# Patient Record
Sex: FEMALE | Race: WHITE | NOT HISPANIC OR LATINO
[De-identification: names, ages, dates, MRNs, and addresses within clinical notes are randomized per-mention and may not be internally consistent; named-entity substitution may affect disease eponyms.]

---

## 2017-05-31 ENCOUNTER — APPOINTMENT (OUTPATIENT)
Dept: INTERNAL MEDICINE | Facility: CLINIC | Age: 37
End: 2017-05-31

## 2017-05-31 VITALS
WEIGHT: 121 LBS | SYSTOLIC BLOOD PRESSURE: 100 MMHG | RESPIRATION RATE: 13 BRPM | BODY MASS INDEX: 20.16 KG/M2 | OXYGEN SATURATION: 99 % | TEMPERATURE: 97.7 F | DIASTOLIC BLOOD PRESSURE: 60 MMHG | HEIGHT: 65 IN | HEART RATE: 67 BPM

## 2017-05-31 DIAGNOSIS — L70.9 ACNE, UNSPECIFIED: ICD-10-CM

## 2017-05-31 DIAGNOSIS — Z78.9 OTHER SPECIFIED HEALTH STATUS: ICD-10-CM

## 2017-05-31 DIAGNOSIS — K51.20 ULCERATIVE (CHRONIC) PROCTITIS W/OUT COMPLICATIONS: ICD-10-CM

## 2017-05-31 DIAGNOSIS — Z82.49 FAMILY HISTORY OF ISCHEMIC HEART DISEASE AND OTHER DISEASES OF THE CIRCULATORY SYSTEM: ICD-10-CM

## 2017-08-29 ENCOUNTER — APPOINTMENT (OUTPATIENT)
Dept: INTERNAL MEDICINE | Facility: CLINIC | Age: 37
End: 2017-08-29
Payer: COMMERCIAL

## 2017-08-29 VITALS — SYSTOLIC BLOOD PRESSURE: 100 MMHG | DIASTOLIC BLOOD PRESSURE: 64 MMHG

## 2017-08-29 DIAGNOSIS — Z00.00 ENCOUNTER FOR GENERAL ADULT MEDICAL EXAMINATION W/OUT ABNORMAL FINDINGS: ICD-10-CM

## 2017-08-29 PROCEDURE — 99214 OFFICE O/P EST MOD 30 MIN: CPT

## 2017-08-29 RX ORDER — AMOXICILLIN 875 MG/1
875 TABLET, FILM COATED ORAL
Qty: 20 | Refills: 0 | Status: DISCONTINUED | COMMUNITY
Start: 2017-03-29

## 2017-08-29 RX ORDER — MINOCYCLINE HYDROCHLORIDE 100 MG/1
100 TABLET ORAL
Refills: 0 | Status: DISCONTINUED | COMMUNITY
End: 2017-08-29

## 2017-08-29 RX ORDER — MESALAMINE 4 G/60ML
ENEMA RECTAL
Refills: 0 | Status: DISCONTINUED | COMMUNITY
End: 2017-08-29

## 2017-08-29 RX ORDER — MINOCYCLINE HYDROCHLORIDE 50 MG/1
50 CAPSULE ORAL
Qty: 60 | Refills: 0 | Status: DISCONTINUED | COMMUNITY
Start: 2017-05-09

## 2017-08-29 RX ORDER — FLUCONAZOLE 150 MG/1
150 TABLET ORAL
Qty: 2 | Refills: 0 | Status: DISCONTINUED | COMMUNITY
Start: 2017-03-29

## 2017-08-29 RX ORDER — MUPIROCIN 20 MG/G
2 OINTMENT TOPICAL
Qty: 22 | Refills: 0 | Status: DISCONTINUED | COMMUNITY
Start: 2017-04-14

## 2017-08-31 LAB
25(OH)D3 SERPL-MCNC: 40.1 NG/ML
ALBUMIN SERPL ELPH-MCNC: 4.3 G/DL
ALP BLD-CCNC: 39 U/L
ALT SERPL-CCNC: 17 U/L
ANION GAP SERPL CALC-SCNC: 16 MMOL/L
APPEARANCE: CLEAR
AST SERPL-CCNC: 24 U/L
BASOPHILS # BLD AUTO: 0.06 K/UL
BASOPHILS NFR BLD AUTO: 1.1 %
BILIRUB SERPL-MCNC: 0.9 MG/DL
BILIRUBIN URINE: NEGATIVE
BLOOD URINE: NEGATIVE
BUN SERPL-MCNC: 11 MG/DL
CALCIUM SERPL-MCNC: 9.8 MG/DL
CHLORIDE SERPL-SCNC: 104 MMOL/L
CHOLEST SERPL-MCNC: 183 MG/DL
CHOLEST/HDLC SERPL: 2.7 RATIO
CO2 SERPL-SCNC: 24 MMOL/L
COLOR: YELLOW
CREAT SERPL-MCNC: 0.75 MG/DL
CRP SERPL-MCNC: <0.2 MG/DL
EOSINOPHIL # BLD AUTO: 0.3 K/UL
EOSINOPHIL NFR BLD AUTO: 5.3 %
ESTIMATED AVERAGE GLUCOSE: 123 MG/DL
FOLATE SERPL-MCNC: 15.7 NG/ML
GLUCOSE QUALITATIVE U: NORMAL MG/DL
GLUCOSE SERPL-MCNC: 94 MG/DL
HBA1C MFR BLD HPLC: 5.9 %
HCT VFR BLD CALC: 41.1 %
HDLC SERPL-MCNC: 67 MG/DL
HGB BLD-MCNC: 12.5 G/DL
IMM GRANULOCYTES NFR BLD AUTO: 0 %
KETONES URINE: NEGATIVE
LDLC SERPL CALC-MCNC: 106 MG/DL
LEUKOCYTE ESTERASE URINE: NEGATIVE
LYMPHOCYTES # BLD AUTO: 1.85 K/UL
LYMPHOCYTES NFR BLD AUTO: 32.5 %
MAN DIFF?: NORMAL
MCHC RBC-ENTMCNC: 27.6 PG
MCHC RBC-ENTMCNC: 30.4 GM/DL
MCV RBC AUTO: 90.7 FL
MONOCYTES # BLD AUTO: 0.66 K/UL
MONOCYTES NFR BLD AUTO: 11.6 %
NEUTROPHILS # BLD AUTO: 2.82 K/UL
NEUTROPHILS NFR BLD AUTO: 49.5 %
NITRITE URINE: NEGATIVE
PH URINE: 6
PLATELET # BLD AUTO: 334 K/UL
POTASSIUM SERPL-SCNC: 4.7 MMOL/L
PROT SERPL-MCNC: 7.9 G/DL
PROTEIN URINE: NEGATIVE MG/DL
RBC # BLD: 4.53 M/UL
RBC # FLD: 13.5 %
SODIUM SERPL-SCNC: 144 MMOL/L
SPECIFIC GRAVITY URINE: 1.01
TRIGL SERPL-MCNC: 49 MG/DL
TSH SERPL-ACNC: 1.18 UIU/ML
UROBILINOGEN URINE: NORMAL MG/DL
VIT B12 SERPL-MCNC: 504 PG/ML
WBC # FLD AUTO: 5.69 K/UL

## 2017-09-08 ENCOUNTER — APPOINTMENT (OUTPATIENT)
Dept: CT IMAGING | Facility: CLINIC | Age: 37
End: 2017-09-08

## 2017-09-14 ENCOUNTER — RESULT CHARGE (OUTPATIENT)
Age: 37
End: 2017-09-14

## 2017-09-16 ENCOUNTER — APPOINTMENT (OUTPATIENT)
Dept: INTERNAL MEDICINE | Facility: CLINIC | Age: 37
End: 2017-09-16

## 2017-09-18 LAB
INR PPP: 1.5 RATIO
INR PPP: 2.1 RATIO
POCT-PROTHROMBIN TIME: 18.1 SECS
POCT-PROTHROMBIN TIME: 24.6 SECS
QUALITY CONTROL: YES
QUALITY CONTROL: YES

## 2017-09-21 ENCOUNTER — APPOINTMENT (OUTPATIENT)
Dept: INTERNAL MEDICINE | Facility: CLINIC | Age: 37
End: 2017-09-21

## 2017-09-21 ENCOUNTER — MEDICATION RENEWAL (OUTPATIENT)
Age: 37
End: 2017-09-21

## 2017-09-21 LAB
INR PPP: 1.2 RATIO
POCT-PROTHROMBIN TIME: 14.2 SECS
QUALITY CONTROL: YES

## 2019-12-17 ENCOUNTER — TRANSCRIPTION ENCOUNTER (OUTPATIENT)
Age: 39
End: 2019-12-17

## 2019-12-30 ENCOUNTER — OUTPATIENT (OUTPATIENT)
Dept: OUTPATIENT SERVICES | Facility: HOSPITAL | Age: 39
LOS: 1 days | End: 2019-12-30
Payer: COMMERCIAL

## 2019-12-30 ENCOUNTER — APPOINTMENT (OUTPATIENT)
Dept: MRI IMAGING | Facility: CLINIC | Age: 39
End: 2019-12-30
Payer: COMMERCIAL

## 2019-12-30 DIAGNOSIS — Z00.8 ENCOUNTER FOR OTHER GENERAL EXAMINATION: ICD-10-CM

## 2019-12-30 PROCEDURE — A9585: CPT

## 2019-12-30 PROCEDURE — 70546 MR ANGIOGRAPH HEAD W/O&W/DYE: CPT

## 2019-12-30 PROCEDURE — 70549 MR ANGIOGRAPH NECK W/O&W/DYE: CPT | Mod: 26

## 2019-12-30 PROCEDURE — 70549 MR ANGIOGRAPH NECK W/O&W/DYE: CPT

## 2019-12-30 PROCEDURE — 70546 MR ANGIOGRAPH HEAD W/O&W/DYE: CPT | Mod: 26

## 2020-01-07 ENCOUNTER — OUTPATIENT (OUTPATIENT)
Dept: OUTPATIENT SERVICES | Facility: HOSPITAL | Age: 40
LOS: 1 days | End: 2020-01-07
Payer: COMMERCIAL

## 2020-01-07 ENCOUNTER — APPOINTMENT (OUTPATIENT)
Dept: MAMMOGRAPHY | Facility: CLINIC | Age: 40
End: 2020-01-07
Payer: COMMERCIAL

## 2020-01-07 DIAGNOSIS — Z12.31 ENCOUNTER FOR SCREENING MAMMOGRAM FOR MALIGNANT NEOPLASM OF BREAST: ICD-10-CM

## 2020-01-07 PROCEDURE — 77067 SCR MAMMO BI INCL CAD: CPT | Mod: 26

## 2020-01-07 PROCEDURE — 77063 BREAST TOMOSYNTHESIS BI: CPT

## 2020-01-07 PROCEDURE — 77063 BREAST TOMOSYNTHESIS BI: CPT | Mod: 26

## 2020-01-07 PROCEDURE — 77067 SCR MAMMO BI INCL CAD: CPT

## 2020-07-21 ENCOUNTER — APPOINTMENT (OUTPATIENT)
Dept: MRI IMAGING | Facility: CLINIC | Age: 40
End: 2020-07-21
Payer: COMMERCIAL

## 2020-07-21 ENCOUNTER — APPOINTMENT (OUTPATIENT)
Dept: MAMMOGRAPHY | Facility: CLINIC | Age: 40
End: 2020-07-21
Payer: COMMERCIAL

## 2020-07-21 ENCOUNTER — OUTPATIENT (OUTPATIENT)
Dept: OUTPATIENT SERVICES | Facility: HOSPITAL | Age: 40
LOS: 1 days | End: 2020-07-21
Payer: COMMERCIAL

## 2020-07-21 ENCOUNTER — APPOINTMENT (OUTPATIENT)
Dept: ULTRASOUND IMAGING | Facility: CLINIC | Age: 40
End: 2020-07-21
Payer: COMMERCIAL

## 2020-07-21 DIAGNOSIS — Z00.8 ENCOUNTER FOR OTHER GENERAL EXAMINATION: ICD-10-CM

## 2020-07-21 PROCEDURE — 76641 ULTRASOUND BREAST COMPLETE: CPT

## 2020-07-21 PROCEDURE — 76641 ULTRASOUND BREAST COMPLETE: CPT | Mod: 26,50

## 2020-09-10 ENCOUNTER — TRANSCRIPTION ENCOUNTER (OUTPATIENT)
Age: 40
End: 2020-09-10

## 2021-01-21 ENCOUNTER — TRANSCRIPTION ENCOUNTER (OUTPATIENT)
Age: 41
End: 2021-01-21

## 2021-02-03 ENCOUNTER — APPOINTMENT (OUTPATIENT)
Dept: OTOLARYNGOLOGY | Facility: CLINIC | Age: 41
End: 2021-02-03

## 2021-04-04 ENCOUNTER — OUTPATIENT (OUTPATIENT)
Dept: OUTPATIENT SERVICES | Facility: HOSPITAL | Age: 41
LOS: 1 days | End: 2021-04-04
Payer: COMMERCIAL

## 2021-04-04 ENCOUNTER — TRANSCRIPTION ENCOUNTER (OUTPATIENT)
Age: 41
End: 2021-04-04

## 2021-04-04 DIAGNOSIS — Z20.828 CONTACT WITH AND (SUSPECTED) EXPOSURE TO OTHER VIRAL COMMUNICABLE DISEASES: ICD-10-CM

## 2021-04-04 LAB — SARS-COV-2 RNA SPEC QL NAA+PROBE: SIGNIFICANT CHANGE UP

## 2021-04-04 PROCEDURE — U0003: CPT

## 2021-04-04 PROCEDURE — C9803: CPT

## 2021-04-04 PROCEDURE — U0005: CPT

## 2021-04-05 DIAGNOSIS — Z20.828 CONTACT WITH AND (SUSPECTED) EXPOSURE TO OTHER VIRAL COMMUNICABLE DISEASES: ICD-10-CM

## 2021-05-04 ENCOUNTER — APPOINTMENT (OUTPATIENT)
Dept: MAMMOGRAPHY | Facility: CLINIC | Age: 41
End: 2021-05-04
Payer: COMMERCIAL

## 2021-05-04 ENCOUNTER — APPOINTMENT (OUTPATIENT)
Dept: ULTRASOUND IMAGING | Facility: CLINIC | Age: 41
End: 2021-05-04
Payer: COMMERCIAL

## 2021-05-04 ENCOUNTER — OUTPATIENT (OUTPATIENT)
Dept: OUTPATIENT SERVICES | Facility: HOSPITAL | Age: 41
LOS: 1 days | End: 2021-05-04
Payer: COMMERCIAL

## 2021-05-04 DIAGNOSIS — Z00.8 ENCOUNTER FOR OTHER GENERAL EXAMINATION: ICD-10-CM

## 2021-05-04 PROCEDURE — 77067 SCR MAMMO BI INCL CAD: CPT | Mod: 26

## 2021-05-04 PROCEDURE — 76641 ULTRASOUND BREAST COMPLETE: CPT | Mod: 26,50

## 2021-05-04 PROCEDURE — 77063 BREAST TOMOSYNTHESIS BI: CPT | Mod: 26

## 2021-05-04 PROCEDURE — 76641 ULTRASOUND BREAST COMPLETE: CPT

## 2021-05-04 PROCEDURE — 77067 SCR MAMMO BI INCL CAD: CPT

## 2021-05-04 PROCEDURE — 77063 BREAST TOMOSYNTHESIS BI: CPT

## 2021-10-07 NOTE — HISTORY OF PRESENT ILLNESS
[FreeTextEntry1] : Ms. Vickers is a 40 year-old woman with PMH carotid artery dissection, DVT who presents to the office today for initial consultation for fibromuscular dysplasia.

## 2021-10-13 ENCOUNTER — APPOINTMENT (OUTPATIENT)
Dept: NEUROLOGY | Facility: CLINIC | Age: 41
End: 2021-10-13

## 2021-10-13 ENCOUNTER — NON-APPOINTMENT (OUTPATIENT)
Age: 41
End: 2021-10-13

## 2021-10-21 ENCOUNTER — TRANSCRIPTION ENCOUNTER (OUTPATIENT)
Age: 41
End: 2021-10-21

## 2021-10-21 ENCOUNTER — INPATIENT (INPATIENT)
Facility: HOSPITAL | Age: 41
LOS: 0 days | Discharge: HOME | DRG: 300 | End: 2021-10-21
Attending: FAMILY MEDICINE | Admitting: FAMILY MEDICINE
Payer: COMMERCIAL

## 2021-10-21 VITALS
HEART RATE: 92 BPM | OXYGEN SATURATION: 96 % | TEMPERATURE: 98 F | HEIGHT: 65 IN | SYSTOLIC BLOOD PRESSURE: 112 MMHG | WEIGHT: 119.93 LBS | DIASTOLIC BLOOD PRESSURE: 76 MMHG | RESPIRATION RATE: 18 BRPM

## 2021-10-21 VITALS
DIASTOLIC BLOOD PRESSURE: 72 MMHG | HEART RATE: 65 BPM | OXYGEN SATURATION: 97 % | SYSTOLIC BLOOD PRESSURE: 112 MMHG | TEMPERATURE: 99 F | RESPIRATION RATE: 16 BRPM

## 2021-10-21 DIAGNOSIS — Z29.9 ENCOUNTER FOR PROPHYLACTIC MEASURES, UNSPECIFIED: ICD-10-CM

## 2021-10-21 DIAGNOSIS — N28.0 ISCHEMIA AND INFARCTION OF KIDNEY: ICD-10-CM

## 2021-10-21 DIAGNOSIS — Z98.890 OTHER SPECIFIED POSTPROCEDURAL STATES: Chronic | ICD-10-CM

## 2021-10-21 DIAGNOSIS — Z87.39 PERSONAL HISTORY OF OTHER DISEASES OF THE MUSCULOSKELETAL SYSTEM AND CONNECTIVE TISSUE: ICD-10-CM

## 2021-10-21 DIAGNOSIS — K08.409 PARTIAL LOSS OF TEETH, UNSPECIFIED CAUSE, UNSPECIFIED CLASS: Chronic | ICD-10-CM

## 2021-10-21 LAB
ALBUMIN SERPL ELPH-MCNC: 3.9 G/DL — SIGNIFICANT CHANGE UP (ref 3.3–5)
ALP SERPL-CCNC: 44 U/L — SIGNIFICANT CHANGE UP (ref 40–120)
ALT FLD-CCNC: 27 U/L — SIGNIFICANT CHANGE UP (ref 12–78)
ANION GAP SERPL CALC-SCNC: 5 MMOL/L — SIGNIFICANT CHANGE UP (ref 5–17)
APPEARANCE UR: CLEAR — SIGNIFICANT CHANGE UP
APTT BLD: 29.4 SEC — SIGNIFICANT CHANGE UP (ref 27.5–35.5)
AST SERPL-CCNC: 26 U/L — SIGNIFICANT CHANGE UP (ref 15–37)
BACTERIA # UR AUTO: ABNORMAL
BILIRUB SERPL-MCNC: 1 MG/DL — SIGNIFICANT CHANGE UP (ref 0.2–1.2)
BILIRUB UR-MCNC: NEGATIVE — SIGNIFICANT CHANGE UP
BUN SERPL-MCNC: 8 MG/DL — SIGNIFICANT CHANGE UP (ref 7–23)
CALCIUM SERPL-MCNC: 9.7 MG/DL — SIGNIFICANT CHANGE UP (ref 8.5–10.1)
CHLORIDE SERPL-SCNC: 108 MMOL/L — SIGNIFICANT CHANGE UP (ref 96–108)
CO2 SERPL-SCNC: 26 MMOL/L — SIGNIFICANT CHANGE UP (ref 22–31)
COLOR SPEC: YELLOW — SIGNIFICANT CHANGE UP
CREAT SERPL-MCNC: 0.64 MG/DL — SIGNIFICANT CHANGE UP (ref 0.5–1.3)
DIFF PNL FLD: ABNORMAL
EPI CELLS # UR: ABNORMAL
GLUCOSE SERPL-MCNC: 100 MG/DL — HIGH (ref 70–99)
GLUCOSE UR QL: NEGATIVE — SIGNIFICANT CHANGE UP
HCG SERPL-ACNC: <1 MIU/ML — SIGNIFICANT CHANGE UP
HCT VFR BLD CALC: 37.5 % — SIGNIFICANT CHANGE UP (ref 34.5–45)
HGB BLD-MCNC: 12.3 G/DL — SIGNIFICANT CHANGE UP (ref 11.5–15.5)
INR BLD: 1.11 RATIO — SIGNIFICANT CHANGE UP (ref 0.88–1.16)
KETONES UR-MCNC: NEGATIVE — SIGNIFICANT CHANGE UP
LEUKOCYTE ESTERASE UR-ACNC: NEGATIVE — SIGNIFICANT CHANGE UP
MCHC RBC-ENTMCNC: 29.1 PG — SIGNIFICANT CHANGE UP (ref 27–34)
MCHC RBC-ENTMCNC: 32.8 GM/DL — SIGNIFICANT CHANGE UP (ref 32–36)
MCV RBC AUTO: 88.7 FL — SIGNIFICANT CHANGE UP (ref 80–100)
NITRITE UR-MCNC: NEGATIVE — SIGNIFICANT CHANGE UP
NRBC # BLD: 0 /100 WBCS — SIGNIFICANT CHANGE UP (ref 0–0)
PH UR: 6 — SIGNIFICANT CHANGE UP (ref 5–8)
PLATELET # BLD AUTO: 314 K/UL — SIGNIFICANT CHANGE UP (ref 150–400)
POTASSIUM SERPL-MCNC: 4.1 MMOL/L — SIGNIFICANT CHANGE UP (ref 3.5–5.3)
POTASSIUM SERPL-SCNC: 4.1 MMOL/L — SIGNIFICANT CHANGE UP (ref 3.5–5.3)
PROT SERPL-MCNC: 7.9 G/DL — SIGNIFICANT CHANGE UP (ref 6–8.3)
PROT UR-MCNC: NEGATIVE — SIGNIFICANT CHANGE UP
PROTHROM AB SERPL-ACNC: 12.9 SEC — SIGNIFICANT CHANGE UP (ref 10.6–13.6)
RBC # BLD: 4.23 M/UL — SIGNIFICANT CHANGE UP (ref 3.8–5.2)
RBC # FLD: 12.9 % — SIGNIFICANT CHANGE UP (ref 10.3–14.5)
RBC CASTS # UR COMP ASSIST: SIGNIFICANT CHANGE UP /HPF (ref 0–4)
SARS-COV-2 RNA SPEC QL NAA+PROBE: SIGNIFICANT CHANGE UP
SODIUM SERPL-SCNC: 139 MMOL/L — SIGNIFICANT CHANGE UP (ref 135–145)
SP GR SPEC: 1.01 — SIGNIFICANT CHANGE UP (ref 1.01–1.02)
UROBILINOGEN FLD QL: NEGATIVE — SIGNIFICANT CHANGE UP
WBC # BLD: 10.8 K/UL — HIGH (ref 3.8–10.5)
WBC # FLD AUTO: 10.8 K/UL — HIGH (ref 3.8–10.5)
WBC UR QL: SIGNIFICANT CHANGE UP

## 2021-10-21 PROCEDURE — 74177 CT ABD & PELVIS W/CONTRAST: CPT | Mod: 26,MA

## 2021-10-21 PROCEDURE — 99285 EMERGENCY DEPT VISIT HI MDM: CPT

## 2021-10-21 PROCEDURE — 99223 1ST HOSP IP/OBS HIGH 75: CPT

## 2021-10-21 PROCEDURE — 96375 TX/PRO/DX INJ NEW DRUG ADDON: CPT

## 2021-10-21 PROCEDURE — 99222 1ST HOSP IP/OBS MODERATE 55: CPT

## 2021-10-21 PROCEDURE — 93005 ELECTROCARDIOGRAM TRACING: CPT

## 2021-10-21 PROCEDURE — 85730 THROMBOPLASTIN TIME PARTIAL: CPT

## 2021-10-21 PROCEDURE — 81001 URINALYSIS AUTO W/SCOPE: CPT

## 2021-10-21 PROCEDURE — 36415 COLL VENOUS BLD VENIPUNCTURE: CPT

## 2021-10-21 PROCEDURE — 85610 PROTHROMBIN TIME: CPT

## 2021-10-21 PROCEDURE — 85027 COMPLETE CBC AUTOMATED: CPT

## 2021-10-21 PROCEDURE — 93010 ELECTROCARDIOGRAM REPORT: CPT

## 2021-10-21 PROCEDURE — 80053 COMPREHEN METABOLIC PANEL: CPT

## 2021-10-21 PROCEDURE — U0005: CPT

## 2021-10-21 PROCEDURE — 96376 TX/PRO/DX INJ SAME DRUG ADON: CPT

## 2021-10-21 PROCEDURE — 74177 CT ABD & PELVIS W/CONTRAST: CPT | Mod: MA

## 2021-10-21 PROCEDURE — U0003: CPT

## 2021-10-21 PROCEDURE — 96374 THER/PROPH/DIAG INJ IV PUSH: CPT

## 2021-10-21 PROCEDURE — 84702 CHORIONIC GONADOTROPIN TEST: CPT

## 2021-10-21 RX ORDER — MORPHINE SULFATE 50 MG/1
4 CAPSULE, EXTENDED RELEASE ORAL ONCE
Refills: 0 | Status: DISCONTINUED | OUTPATIENT
Start: 2021-10-21 | End: 2021-10-21

## 2021-10-21 RX ORDER — RIVAROXABAN 15 MG-20MG
1 KIT ORAL
Qty: 1 | Refills: 0
Start: 2021-10-21 | End: 2021-12-19

## 2021-10-21 RX ORDER — HEPARIN SODIUM 5000 [USP'U]/ML
INJECTION INTRAVENOUS; SUBCUTANEOUS
Qty: 25000 | Refills: 0 | Status: DISCONTINUED | OUTPATIENT
Start: 2021-10-21 | End: 2021-10-21

## 2021-10-21 RX ORDER — SODIUM CHLORIDE 9 MG/ML
1000 INJECTION INTRAMUSCULAR; INTRAVENOUS; SUBCUTANEOUS ONCE
Refills: 0 | Status: COMPLETED | OUTPATIENT
Start: 2021-10-21 | End: 2021-10-21

## 2021-10-21 RX ORDER — HEPARIN SODIUM 5000 [USP'U]/ML
4000 INJECTION INTRAVENOUS; SUBCUTANEOUS EVERY 6 HOURS
Refills: 0 | Status: DISCONTINUED | OUTPATIENT
Start: 2021-10-21 | End: 2021-10-21

## 2021-10-21 RX ORDER — HYDROMORPHONE HYDROCHLORIDE 2 MG/ML
0.25 INJECTION INTRAMUSCULAR; INTRAVENOUS; SUBCUTANEOUS EVERY 4 HOURS
Refills: 0 | Status: DISCONTINUED | OUTPATIENT
Start: 2021-10-21 | End: 2021-10-21

## 2021-10-21 RX ORDER — FLUTICASONE PROPIONATE 50 MCG
1 SPRAY, SUSPENSION NASAL
Qty: 0 | Refills: 0 | DISCHARGE

## 2021-10-21 RX ORDER — ONDANSETRON 8 MG/1
4 TABLET, FILM COATED ORAL EVERY 8 HOURS
Refills: 0 | Status: DISCONTINUED | OUTPATIENT
Start: 2021-10-21 | End: 2021-10-21

## 2021-10-21 RX ORDER — HYDROMORPHONE HYDROCHLORIDE 2 MG/ML
0.5 INJECTION INTRAMUSCULAR; INTRAVENOUS; SUBCUTANEOUS EVERY 4 HOURS
Refills: 0 | Status: DISCONTINUED | OUTPATIENT
Start: 2021-10-21 | End: 2021-10-21

## 2021-10-21 RX ORDER — OXYCODONE HYDROCHLORIDE 5 MG/1
1 TABLET ORAL
Qty: 12 | Refills: 0
Start: 2021-10-21 | End: 2021-10-23

## 2021-10-21 RX ORDER — ASPIRIN/CALCIUM CARB/MAGNESIUM 324 MG
1 TABLET ORAL
Qty: 0 | Refills: 0 | DISCHARGE

## 2021-10-21 RX ORDER — ONDANSETRON 8 MG/1
4 TABLET, FILM COATED ORAL ONCE
Refills: 0 | Status: COMPLETED | OUTPATIENT
Start: 2021-10-21 | End: 2021-10-21

## 2021-10-21 RX ORDER — IOHEXOL 300 MG/ML
30 INJECTION, SOLUTION INTRAVENOUS ONCE
Refills: 0 | Status: DISCONTINUED | OUTPATIENT
Start: 2021-10-21 | End: 2021-10-21

## 2021-10-21 RX ORDER — HEPARIN SODIUM 5000 [USP'U]/ML
2000 INJECTION INTRAVENOUS; SUBCUTANEOUS EVERY 6 HOURS
Refills: 0 | Status: DISCONTINUED | OUTPATIENT
Start: 2021-10-21 | End: 2021-10-21

## 2021-10-21 RX ORDER — BENZOYL PEROXIDE MICRONIZED 5.8 %
1 TOWELETTE (EA) TOPICAL
Qty: 0 | Refills: 0 | DISCHARGE

## 2021-10-21 RX ORDER — FEXOFENADINE HCL 30 MG
1 TABLET ORAL
Qty: 0 | Refills: 0 | DISCHARGE

## 2021-10-21 RX ORDER — ONDANSETRON 8 MG/1
1 TABLET, FILM COATED ORAL
Qty: 9 | Refills: 0
Start: 2021-10-21 | End: 2021-10-23

## 2021-10-21 RX ADMIN — MORPHINE SULFATE 4 MILLIGRAM(S): 50 CAPSULE, EXTENDED RELEASE ORAL at 03:04

## 2021-10-21 RX ADMIN — MORPHINE SULFATE 4 MILLIGRAM(S): 50 CAPSULE, EXTENDED RELEASE ORAL at 01:11

## 2021-10-21 RX ADMIN — HEPARIN SODIUM UNIT(S)/HR: 5000 INJECTION INTRAVENOUS; SUBCUTANEOUS at 10:02

## 2021-10-21 RX ADMIN — ONDANSETRON 4 MILLIGRAM(S): 8 TABLET, FILM COATED ORAL at 01:11

## 2021-10-21 RX ADMIN — SODIUM CHLORIDE 1000 MILLILITER(S): 9 INJECTION INTRAMUSCULAR; INTRAVENOUS; SUBCUTANEOUS at 05:30

## 2021-10-21 RX ADMIN — SODIUM CHLORIDE 1000 MILLILITER(S): 9 INJECTION INTRAMUSCULAR; INTRAVENOUS; SUBCUTANEOUS at 01:56

## 2021-10-21 RX ADMIN — SODIUM CHLORIDE 1000 MILLILITER(S): 9 INJECTION INTRAMUSCULAR; INTRAVENOUS; SUBCUTANEOUS at 04:35

## 2021-10-21 RX ADMIN — MORPHINE SULFATE 4 MILLIGRAM(S): 50 CAPSULE, EXTENDED RELEASE ORAL at 03:03

## 2021-10-21 RX ADMIN — SODIUM CHLORIDE 1000 MILLILITER(S): 9 INJECTION INTRAMUSCULAR; INTRAVENOUS; SUBCUTANEOUS at 01:11

## 2021-10-21 NOTE — CONSULT NOTE ADULT - SUBJECTIVE AND OBJECTIVE BOX
INCOMPLETE    Vascular Attending: Dr. Minaya    HPI: 41 YO Female w/ PMHx of fibromuscular dysphasia s/p bilateral carotid artery dissections & R vertebral artery dissection (2017) p/w LLQ. Pain started suddenly at about 5:30pm after dinner and is described as sharp, severe and 10/10. Pain has moved more medially since then and briefly decreased in severity, but is now severe again. Patient was diagnosed with fibromuscular dysphasia in 2017 at the same of the arterial dissections and was admitted to [facility]'s ICU and started on a Heparin drip, but no surgical intervention was given. Patient is now followed periodically by vascular surgeon, Dr. Luu at [facility]. Pain is associated with nausea and R sided anterior headache that began prior to the abdominal pain. Admits to getting headaches often, and recently had a CTA about 6 weeks ago that was stable as per the patient. Patient is also followed by neurosurgeon, Dr. Solomon Lacey. Denies fever, chills, chest pain, SOB, dizziness, lightheadedness, weakness, urinary frequency, urgency or pain, changes in bowel movements.     PAST MEDICAL & SURGICAL HISTORY:    Constitutional: Denies fever, fatigue or weight loss.  Skin: Denies rash.  Eyes: Denies recent vision problems or eye pain.  ENT: Denies congestion, ear pain, or sore throat.  Endocrine: Denies thyroid problems.  Cardiovascular: Denies chest pain or palpation.  Respiratory: Denies cough, shortness of breath, congestion, or wheezing.  Gastrointestinal: Denies abdominal pain, nausea, vomiting, or diarrhea.  Genitourinary: Denies dysuria.  Musculoskeletal: Denies joint swelling.  Neurologic: Denies headache.    MEDICATIONS  (STANDING):    MEDICATIONS  (PRN):    Allergies    No Known Allergies    Intolerances    SOCIAL HISTORY:  Admits to smoking minimally in college   Social drinker    Vital Signs Last 24 Hrs  T(C): 36.7 (21 Oct 2021 06:32), Max: 36.9 (21 Oct 2021 04:20)  T(F): 98 (21 Oct 2021 06:32), Max: 98.4 (21 Oct 2021 04:20)  HR: 74 (21 Oct 2021 06:32) (72 - 92)  BP: 125/75 (21 Oct 2021 06:32) (112/76 - 125/75)  BP(mean): --  RR: 20 (21 Oct 2021 06:32) (18 - 20)  SpO2: 99% (21 Oct 2021 06:32) (96% - 99%)    PHYSICAL EXAM:  GENERAL: No acute distress, well-developed  HEAD:  Atraumatic, Normocephalic    MSK:   NEURO:     LABS:                        12.3   10.80 )-----------( 314      ( 21 Oct 2021 01:22 )             37.5     10-    139  |  108  |  8   ----------------------------<  100<H>  4.1   |  26  |  0.64    Ca    9.7      21 Oct 2021 01:22    TPro  7.9  /  Alb  3.9  /  TBili  1.0  /  DBili  x   /  AST  26  /  ALT  27  /  AlkPhos  44  10-21      Urinalysis Basic - ( 21 Oct 2021 01:22 )    Color: Yellow / Appearance: Clear / S.010 / pH: x  Gluc: x / Ketone: Negative  / Bili: Negative / Urobili: Negative   Blood: x / Protein: Negative / Nitrite: Negative   Leuk Esterase: Negative / RBC: 0-2 /HPF / WBC 0-2   Sq Epi: x / Non Sq Epi: Many / Bacteria: Many        RADIOLOGY & ADDITIONAL STUDIES    Impression and Plan: Vascular Attending: Dr. Bueno    HPI: 41 YO Female w/ PMHx of fibromuscular dysplasia s/p bilateral carotid artery dissections & R vertebral artery dissection (2017) p/w LLQ. Pain started suddenly at about 5:30pm after dinner and is described as sharp, severe and 10/10. Pain has moved more medially since then and briefly decreased in severity, but is now severe again. Patient was diagnosed with fibromuscular dysphasia in 2017 at the same of the arterial dissections and was admitted to [facility]'s ICU and started on a Heparin drip, but no surgical intervention was given. Patient was discharged on Xarelto, but discontinued it after 6 months due to heavy menstrual bleeding and now takes ASA81 daily. Patient is now followed periodically by vascular, Dr. Luu at [facility]. Pain is associated with nausea and R sided anterior headache that began prior to the abdominal pain. Admits to getting headaches often, and recently had a CTA about 6 weeks ago that was stable as per the patient. Patient is also followed by neurosurgeon, Dr. Solomon Lacey. Denies fever, chills, chest pain, SOB, dizziness, lightheadedness, weakness, urinary frequency, urgency or pain, changes in bowel movements.     PAST MEDICAL & SURGICAL HISTORY:  Fibromuscular dyplasia    Constitutional: Denies fever, fatigue or weight loss.  Skin: Denies rash.  Eyes: Denies recent vision problems or eye pain.  ENT: Denies congestion, ear pain, or sore throat.  Endocrine: Denies thyroid problems.  Cardiovascular: Denies chest pain or palpation.  Respiratory: Denies cough, shortness of breath, congestion, or wheezing.  Gastrointestinal: Denies abdominal pain, nausea, vomiting, or diarrhea.  Genitourinary: Denies dysuria.  Musculoskeletal: Denies joint swelling.  Neurologic: Denies headache.    MEDICATIONS  (STANDING):    MEDICATIONS  (PRN):    Allergies    No Known Allergies    Intolerances    SOCIAL HISTORY:  Admits to smoking minimally in college   Social drinker    Vital Signs Last 24 Hrs  T(C): 36.7 (21 Oct 2021 06:32), Max: 36.9 (21 Oct 2021 04:20)  T(F): 98 (21 Oct 2021 06:32), Max: 98.4 (21 Oct 2021 04:20)  HR: 74 (21 Oct 2021 06:32) (72 - 92)  BP: 125/75 (21 Oct 2021 06:32) (112/76 - 125/75)  BP(mean): --  RR: 20 (21 Oct 2021 06:32) (18 - 20)  SpO2: 99% (21 Oct 2021 06:32) (96% - 99%)    PHYSICAL EXAM:  GENERAL: No acute distress, well-developed  HEAD:  Atraumatic, Normocephalic  ABDOMEN: Soft, nondistended, nontender. No flank pain bilaterally  VASCULAR: Radial, DP & PT pulses palpable  NEURO: A&O x 3. No weakness or deficits. Sensation and motor intact.    LABS:                        12.3   10.80 )-----------( 314      ( 21 Oct 2021 01:22 )             37.5     10    139  |  108  |  8   ----------------------------<  100<H>  4.1   |  26  |  0.64    Ca    9.7      21 Oct 2021 01:22    TPro  7.9  /  Alb  3.9  /  TBili  1.0  /  DBili  x   /  AST  26  /  ALT  27  /  AlkPhos  44  10-      Urinalysis Basic - ( 21 Oct 2021 01:22 )    Color: Yellow / Appearance: Clear / S.010 / pH: x  Gluc: x / Ketone: Negative  / Bili: Negative / Urobili: Negative   Blood: x / Protein: Negative / Nitrite: Negative   Leuk Esterase: Negative / RBC: 0-2 /HPF / WBC 0-2   Sq Epi: x / Non Sq Epi: Many / Bacteria: Many    RADIOLOGY & ADDITIONAL STUDIES  < from: CT Abdomen and Pelvis w/ IV Cont (10.21.21 @ 03:32) >  FINDINGS:    LOWER CHEST: Within normal limits.    LIVER: Within normal limits.  BILE DUCTS: Normal caliber.  GALLBLADDER: Within normal limits.  SPLEEN: Within normal limits.  PANCREAS: Within normal limits.  ADRENALS: Within normal limits.  KIDNEYS/URETERS: Heterogeneous enhancement of the left posterior kidney. No hydronephrosis.    BLADDER: Within normal limits.  REPRODUCTIVE ORGANS: Uterus and adnexa are grossly unremarkable. Small left adnexal cyst.    BOWEL: No bowel obstruction. Appendix is unremarkable.  PERITONEUM: No ascites.  VESSELS: Normal caliber abdominal aorta.  RETROPERITONEUM/LYMPH NODES: No lymphadenopathy.  ABDOMINAL WALL: Within normal limits.  BONES: Within normal limits.    IMPRESSION:    Heterogeneous enhancement of the left posterior kidney, favored to be due to subsegmental pyelonephritis versus less likely infarct; correlate clinically and consider follow-up imaging.

## 2021-10-21 NOTE — CONSULT NOTE ADULT - SUBJECTIVE AND OBJECTIVE BOX
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 Pt states her pain in her LLQ and can at time be nauseous from it.  Describes the pain as a constant burning 10/10 pain that is non-radiating.Pt was on xarelto but had stopped due to heavy menses as well completing 6 month treatment for prev emboli.  Pt has been surveyed by vascular outpatient and  states she had CTA less than 8 weeks ago which was unremarkable as per her outpt vascular surgery     In the ED pt underwent CT abdomen and pelvis with contrast which was reported as: Heterogeneous enhancement of the left posterior kidney. Vascular surgery was consulted in ED as well and left management recommendations including starting heparin gtt (21 Oct 2021 09:00)    Renal consult called for Left renal infarct. History obtained from chart and patient.       PAST MEDICAL HISTORY:  History of fibromuscular dysplasia  Seasonal allergies  Ulcerative proctitis        PAST SURGICAL HISTORY:  H/O rhinoplasty  S/P LASIK surgery of both eyes  [place] teeth extracted        FAMILY HISTORY:  FH: HTN (hypertension) (Mother)    Family history of cardiac pacemaker (Father)        SOCIAL HISTORY: no smoking, social alcohol use    Allergies    No Known Allergies    Intolerances      Home Medications:  Allegra 24 Hour Allergy oral tablet: 1 tab(s) orally once a day (21 Oct 2021 11:30)  aspirin 81 mg oral tablet: 1 tab(s) orally once a day (21 Oct 2021 11:30)  Multiple Vitamins with Iron oral tablet: 1 tab(s) orally once a day (21 Oct 2021 11:30)    MEDICATIONS  (STANDING):  heparin  Infusion.  Unit(s)/Hr (10 mL/Hr) IV Continuous <Continuous>    MEDICATIONS  (PRN):  heparin   Injectable 4000 Unit(s) IV Push every 6 hours PRN For aPTT less than 40  heparin   Injectable 2000 Unit(s) IV Push every 6 hours PRN For aPTT between 40 - 57  HYDROmorphone  Injectable 0.5 milliGRAM(s) IV Push every 4 hours PRN Severe Pain (7 - 10)  HYDROmorphone  Injectable 0.25 milliGRAM(s) IV Push every 4 hours PRN Moderate Pain (4 - 6)  ondansetron    Tablet 4 milliGRAM(s) Oral every 8 hours PRN Nausea and/or Vomiting      REVIEW OF SYSTEMS:  General: no fever  Respiratory: No cough, SOB  Cardiovascular: No CP or Palpitations	  Gastrointestinal: + abdominal pain   Genitourinary: No urinary complaints	  Musculoskeletal: No leg swelling, No new rash or lesions		  all other systems negative    T(F): 98.8 (10-21-21 @ 08:05), Max: 98.8 (10-21-21 @ 08:05)  HR: 62 (10-21-21 @ 08:05) (62 - 92)  BP: 114/74 (10-21-21 @ 08:05) (112/76 - 125/75)  RR: 17 (10-21-21 @ 08:05) (17 - 20)  SpO2: 100% (10-21-21 @ 08:05) (96% - 100%)  Wt(kg): --    PHYSICAL EXAM:  General: NAD  Respiratory: b/l air entry  Cardiovascular: S1 S2  Gastrointestinal: soft, abdominal tenderness  Extremities: no edema        10-21    139  |  108  |  8   ----------------------------<  100<H>  4.1   |  26  |  0.64    Ca    9.7      21 Oct 2021 01:22    TPro  7.9  /  Alb  3.9  /  TBili  1.0  /  DBili  x   /  AST  26  /  ALT  27  /  AlkPhos  44  10-21                          12.3   10.80 )-----------( 314      ( 21 Oct 2021 01:22 )             37.5       Hemoglobin: 12.3 g/dL (10-21 @ 01:22)  Hematocrit: 37.5 % (10-21 @ 01:22)  Potassium, Serum: 4.1 mmol/L (10-21 @ 01:22)  Blood Urea Nitrogen, Serum: 8 mg/dL (10-21 @ 01:22)      Creatinine, Serum: 0.64 (10-21 @ 01:22)      Urinalysis Basic - ( 21 Oct 2021 01:22 )    Color: Yellow / Appearance: Clear / S.010 / pH: x  Gluc: x / Ketone: Negative  / Bili: Negative / Urobili: Negative   Blood: x / Protein: Negative / Nitrite: Negative   Leuk Esterase: Negative / RBC: 0-2 /HPF / WBC 0-2   Sq Epi: x / Non Sq Epi: Many / Bacteria: Many      LIVER FUNCTIONS - ( 21 Oct 2021 01:22 )  Alb: 3.9 g/dL / Pro: 7.9 g/dL / ALK PHOS: 44 U/L / ALT: 27 U/L / AST: 26 U/L / GGT: x             < from: CT Abdomen and Pelvis w/ IV Cont (10.21.21 @ 03:32) >    EXAM:  CT ABDOMEN AND PELVIS IC                            PROCEDURE DATE:  10/21/2021          INTERPRETATION:  CLINICAL INFORMATION: 40 years old. Female. lower abdominal pain    r/o infection.    COMPARISON: None.    CONTRAST/COMPLICATIONS:  IV Contrast: Omnipaque 350  90 cc administered  10 cc discarded.  Oral Contrast: NONE  Complications: None reported at time of study completion    PROCEDURE:  CT of the Abdomen and Pelvis was performed.  Sagittal and coronal reformats were performed.    FINDINGS:    LOWER CHEST: Within normal limits.    LIVER: Within normal limits.  BILE DUCTS: Normal caliber.  GALLBLADDER: Within normal limits.  SPLEEN: Within normal limits.  PANCREAS: Within normal limits.  ADRENALS: Within normal limits.  KIDNEYS/URETERS: Heterogeneous enhancement of the left posterior kidney. No hydronephrosis.    BLADDER: Within normal limits.  REPRODUCTIVE ORGANS: Uterus and adnexa are grossly unremarkable. Small left adnexal cyst.    BOWEL: No bowel obstruction. Appendix is unremarkable.  PERITONEUM: No ascites.  VESSELS: Normal caliber abdominal aorta.  RETROPERITONEUM/LYMPH NODES: No lymphadenopathy.  ABDOMINAL WALL: Within normal limits.  BONES: Within normal limits.    IMPRESSION:    Heterogeneous enhancement of the left posterior kidney, favored to be due to subsegmental pyelonephritis versus less likely infarct; correlate clinically and consider follow-up imaging.      --- End of Report ---            VIC LOPEZ MD; Attending Radiologist  This document has been electronically signed. Oct 21 2021  3:40AM    < end of copied text >            I&O's Detail

## 2021-10-21 NOTE — DISCHARGE NOTE NURSING/CASE MANAGEMENT/SOCIAL WORK - PATIENT PORTAL LINK FT
You can access the FollowMyHealth Patient Portal offered by [facility] by registering at the following website: [email or web address]. By joining [facility]’s FollowMyHealth portal, you will also be able to view your health information using other applications (apps) compatible with our system.

## 2021-10-21 NOTE — CONSULT NOTE ADULT - ASSESSMENT
Left Renal infarct  h/o Fibromuscular dysplasia    Renal indcies stable. Vascular surgery evaluation in progress. Pt seen by vascular surgery team at [facility]. No hematuria or any other urinary complaints.   Will follow electrolytes and renal function trend. D/w family at bedside. Further recommendations pending clinical course. Thank you for the courtesy of this referral.

## 2021-10-21 NOTE — H&P ADULT - PROBLEM SELECTOR PLAN 1
-acute sec to FMD  -start heparin gtt  -apprec vascular dr alcantara recs and cardio workup-apprec cardio recs Dr Steward  -pt will likely need renal angiogram-will f/u with vascular and radiology on specific type of study to r/u pb in seeds of renal arteries  -apprec renal recs dr parisi  -apprec hemat recs for eventual restarting AC given stoppage in past due to heavy menses for outpt, as well as meds to beds for patient on discharge -acute sec to FMD  -start heparin gtt  -apprec vascular dr alcantara recs and cardio workup-apprec cardio recs Dr Steward  -pt will likely need renal angiogram-will f/u with vascular and radiology on specific type of study to r/u pb in seeds of renal arteries and/or further recommendations on management  -pain control prn  -apprec renal recs dr parisi  -apprec hemat recs for eventual restarting AC given stoppage in past due to heavy menses for outpt, as well as meds to beds for patient on discharge

## 2021-10-21 NOTE — ED PROVIDER NOTE - PROGRESS NOTE DETAILS
Seen and evaluated by Surgical PA who has reviewed case in depth with Vasc Surgeon, Mary. Mateusz Heparinization and admission to medicine.

## 2021-10-21 NOTE — ED PROVIDER NOTE - CLINICAL SUMMARY MEDICAL DECISION MAKING FREE TEXT BOX
h/o fibromuscular dysplasia  c/o acute left abdominal pain ... CT suggesting  possible Renal infarct  will order ct angio abdomen  ICU consulted

## 2021-10-21 NOTE — H&P ADULT - NSICDXFAMILYHX_GEN_ALL_CORE_FT
FAMILY HISTORY:  Father  Still living? Unknown  Family history of cardiac pacemaker, Age at diagnosis: 71-80    Mother  Still living? Unknown  FH: HTN (hypertension), Age at diagnosis: Age Unknown

## 2021-10-21 NOTE — H&P ADULT - NSHPOUTPATIENTPROVIDERS_GEN_ALL_CORE
PMD-Dr Yazan Kunz ([facility])  Vascular-Dr. Luu ([facility])  Gyn- Dr Andrae LUTZ-Dr Damon

## 2021-10-21 NOTE — DISCHARGE NOTE PROVIDER - CARE PROVIDER_API CALL
RULA NELSON  Internal Medicine  [id number]  [place] [address]  Phone: ([phone]  Fax: ()-  Established Patient  Follow Up Time: 1 week    Dr. Andrae (outpatient gynceologist)  Phone: (   )    -  Fax: (   )    -  Established Patient  Follow Up Time: 1 week    Norman Kunz  [address] [id number]  Phone: [phone]  Fax: [phone]  Established Patient  Follow Up Time: 1 week    Mahesh Garcias  HEMATOLOGY  [address], [address]  [place] [address]  Phone: [phone]  Fax: [phone]  Follow Up Time:    RULA NELSON  Internal Medicine  [id number]  [place] [address]  Phone: ()-  Fax: ()-  Established Patient  Follow Up Time: 1 week    Norman Kunz  [address] [id number]  Phone: [phone]  Fax: [phone]  Established Patient  Follow Up Time: 1 week    Mahesh Garcias  HEMATOLOGY  [address], [address]  [place] [address]  Phone: [phone]  Fax: [phone]  Follow Up Time: 2 weeks    Dr. Andrae (outpatient gynceologist)  Phone: (   )    -  Fax: (   )    -  Established Patient  Follow Up Time: 1 week

## 2021-10-21 NOTE — CONSULT NOTE ADULT - ASSESSMENT
History of fibromuscular dysplasia now presenting with lowe abdominal pain with CT scan showing evidence of renal infarct probably related to FMD of left renal artery  has been on xarelto in past and had difficulty with increased menses    Recommendations:  1.  discussed with Dr. Byrne. Patient to be discharged on xarelto.  to have outpatient followup with her vascular surgeon  2.  will evaluate patient as an outpatient for potential menometrorrhagia and need for IV fe.  to also see gyn in evaluation  3.  will evaluate patient as outpatient for additional hematologic issues

## 2021-10-21 NOTE — CONSULT NOTE ADULT - SUBJECTIVE AND OBJECTIVE BOX
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 Pt states her pain in her LLQ and can at time be nauseous from it.  Describes the pain as a constant burning 10/10 pain that is non-radiating.Pt was on xarelto but had stopped due to heavy menses as well completing 6 month treatment for prev emboli.  Pt has been surveyed by vascular outpatient and  states she had CTA less than 8 weeks ago which was unremarkable as per her outpt vascular surgery     In the ED pt underwent CT abdomen and pelvis with contrast which was reported as: Heterogeneous enhancement of the left posterior kidney. Vascular surgery was consulted in ED as well and left management recommendations including starting heparin gtt (21 Oct 2021 09:00)       ROS:  Negative except for:    PAST MEDICAL & SURGICAL HISTORY:  History of fibromuscular dysplasia  09/2017 diagnosed in [place], follows vasc surgery at [facility]    Seasonal allergies    Ulcerative proctitis    H/O rhinoplasty  age 14    S/P LASIK surgery of both eyes  2005    [place] teeth extracted        SOCIAL HISTORY:    FAMILY HISTORY:  FH: HTN (hypertension) (Mother)    Family history of cardiac pacemaker (Father)        MEDICATIONS  (STANDING):  heparin  Infusion.  Unit(s)/Hr (10 mL/Hr) IV Continuous <Continuous>    MEDICATIONS  (PRN):  heparin   Injectable 4000 Unit(s) IV Push every 6 hours PRN For aPTT less than 40  heparin   Injectable 2000 Unit(s) IV Push every 6 hours PRN For aPTT between 40 - 57  HYDROmorphone  Injectable 0.5 milliGRAM(s) IV Push every 4 hours PRN Severe Pain (7 - 10)  HYDROmorphone  Injectable 0.25 milliGRAM(s) IV Push every 4 hours PRN Moderate Pain (4 - 6)  ondansetron    Tablet 4 milliGRAM(s) Oral every 8 hours PRN Nausea and/or Vomiting      Allergies    No Known Allergies    Intolerances        Vital Signs Last 24 Hrs  T(C): 37.1 (21 Oct 2021 08:05), Max: 37.1 (21 Oct 2021 08:05)  T(F): 98.8 (21 Oct 2021 08:05), Max: 98.8 (21 Oct 2021 08:05)  HR: 62 (21 Oct 2021 08:05) (62 - 92)  BP: 114/74 (21 Oct 2021 08:05) (112/76 - 125/75)  BP(mean): --  RR: 17 (21 Oct 2021 08:05) (17 - 20)  SpO2: 100% (21 Oct 2021 08:05) (96% - 100%)    PHYSICAL EXAM  General: adult in NAD  HEENT: clear oropharynx, anicteric sclera, pink conjunctivae  Neck: supple  CV: normal S1S2 with no murmur rubs or gallops  Lungs: clear to auscultation, no wheezes, no rhales  Abdomen: soft non-tender non-distended, no hepato/splenomegaly  Ext: no clubbing cyanosis or edema  Skin: no rashes and no petichiae  Neuro: alert and oriented X3 no focal deficits      LABS:    CBC Full  -  ( 21 Oct 2021 01:22 )  WBC Count : 10.80 K/uL  RBC Count : 4.23 M/uL  Hemoglobin : 12.3 g/dL  Hematocrit : 37.5 %  Platelet Count - Automated : 314 K/uL  Mean Cell Volume : 88.7 fl  Mean Cell Hemoglobin : 29.1 pg  Mean Cell Hemoglobin Concentration : 32.8 gm/dL  Auto Neutrophil # : x  Auto Lymphocyte # : x  Auto Monocyte # : x  Auto Eosinophil # : x  Auto Basophil # : x  Auto Neutrophil % : x  Auto Lymphocyte % : x  Auto Monocyte % : x  Auto Eosinophil % : x  Auto Basophil % : x    10-21    139  |  108  |  8   ----------------------------<  100<H>  4.1   |  26  |  0.64    Ca    9.7      21 Oct 2021 01:22    TPro  7.9  /  Alb  3.9  /  TBili  1.0  /  DBili  x   /  AST  26  /  ALT  27  /  AlkPhos  44  10-21    PT/INR - ( 21 Oct 2021 09:01 )   PT: 12.9 sec;   INR: 1.11 ratio         PTT - ( 21 Oct 2021 09:01 )  PTT:29.4 sec          BLOOD SMEAR INTERPRETATION:    RADIOLOGY & ADDITIONAL STUDIES:    < from: CT Abdomen and Pelvis w/ IV Cont (10.21.21 @ 03:32) >  EXAM:  CT ABDOMEN AND PELVIS IC                            PROCEDURE DATE:  10/21/2021          INTERPRETATION:  CLINICAL INFORMATION: 40 years old. Female. lower abdominal pain    r/o infection.    COMPARISON: None.    CONTRAST/COMPLICATIONS:  IV Contrast: Omnipaque 350  90 cc administered  10 cc discarded.  Oral Contrast: NONE  Complications: None reported at time of study completion    PROCEDURE:  CT of the Abdomen and Pelvis was performed.  Sagittal and coronal reformats were performed.    FINDINGS:    LOWER CHEST: Within normal limits.    LIVER: Within normal limits.  BILE DUCTS: Normal caliber.  GALLBLADDER: Within normal limits.  SPLEEN: Within normal limits.  PANCREAS: Within normal limits.  ADRENALS: Within normal limits.  KIDNEYS/URETERS: Heterogeneous enhancement of the left posterior kidney. No hydronephrosis.    BLADDER: Within normal limits.  REPRODUCTIVE ORGANS: Uterus and adnexa are grossly unremarkable. Small left adnexal cyst.    BOWEL: No bowel obstruction. Appendix is unremarkable.  PERITONEUM: No ascites.  VESSELS: Normal caliber abdominal aorta.  RETROPERITONEUM/LYMPH NODES: No lymphadenopathy.  ABDOMINAL WALL: Within normal limits.  BONES: Within normal limits.    IMPRESSION:    Heterogeneous enhancement of the left posterior kidney, favored to be due to subsegmental pyelonephritis versus less likely infarct; correlate clinically and consider follow-up imaging.      --- End of Report ---            VIC LOPEZ MD; Attending Radiologist  This document has been electronically signed. Oct 21 2021  3:40AM    < end of copied text >

## 2021-10-21 NOTE — H&P ADULT - NSICDXPASTMEDICALHX_GEN_ALL_CORE_FT
PAST MEDICAL HISTORY:  History of fibromuscular dysplasia 09/2017 diagnosed in [place], follows vasc surgery at [facility]    Seasonal allergies     Ulcerative proctitis

## 2021-10-21 NOTE — DISCHARGE NOTE PROVIDER - HOSPITAL COURSE
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 Pt states her pain in her LLQ and can at time be nauseous from it.  Describes the pain as a constant burning 10/10 pain that is non-radiating.Pt was on xarelto but had stopped due to heavy menses as well completing 6 month treatment for prev emboli.  Pt has been surveyed by vascular outpatient and  states she had CTA less than 8 weeks ago which was unremarkable as per her outpt vascular surgery     In the ED pt underwent CT abdomen and pelvis with contrast which was reported as: Heterogeneous enhancement of the left posterior kidney. Vascular surgery was consulted in ED as well and left management recommendations including starting heparin gtt (21 Oct 2021 09:00)      ---  HOSPITAL COURSE/PERTINENT LABS/PROCEDURES PERFORMED/PENDING TESTS: Pt was started on a heparin drip and seen by vascular, hematology, cardiology and renal. 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 Pt agreed on plan and is now stable for discharge.     ---  PATIENT CONDITION:  - stable    ---  PHYSICAL EXAM ON DAY OF DISCHARGE:   Physical Exam:  · Constitutional  detailed exam  · Constitutional Details  well-groomed; no distress  · Eyes  EOMI; PERRL; no drainage or redness  · ENMT  No oral lesions; no gross abnormalities  · Respiratory  Breath Sounds equal & clear to percussion & auscultation, no accessory muscle use  · Cardiovascular  Regular rate & rhythm, normal S1, S2; no murmurs, gallops or rubs; no S3, S4  · Gastrointestinal  detailed exam  · GI Abnormal  tender but improving/controlled  · Tenderness  LLQ  · Gastrointestinal Comments  on mild to moderate palpation  · Extremities  No cyanosis, clubbing or edema  · Neurological  Alert & oriented; no sensory, motor or coordination deficits, normal reflexes  · Skin  No lesions; no rash  · Musculoskeletal  No joint pain, swelling or deformity; no limitation of movement  · Psychiatric  Affect and characteristics of appearance, verbalizations, behaviors are appropriate        ---  CONSULTANTS:   Vascular- Dr Byrne  Cardio- Dr Steward  Hematology-Dr Garcias  Renal-Dr Foss  ---  ADVANCED CARE PLANNING:  - Code status:    FULL  - MOLST completed:      [  x] NO     [  ] YES    ---  TIME SPENT:  I, the attending physician, was physically present for the key portions of the evaluation and management (E/M) service provided. The total amount of time spent reviewing the hospital notes, laboratory values, imaging findings, assessing/counseling the patient, discussing with consultant physicians, social work, nursing staff was 35 minutes

## 2021-10-21 NOTE — H&P ADULT - NSICDXPASTSURGICALHX_GEN_ALL_CORE_FT
PAST SURGICAL HISTORY:  H/O rhinoplasty age 14    S/P LASIK surgery of both eyes 2005    [place] teeth extracted

## 2021-10-21 NOTE — PROVIDER CONTACT NOTE (EICU) - RECOMMENDATIONS
On review of CT, no pulmonary abnormalities seen on lower lung fields, however abnormality described of left kidney concerning for possible infarct.   Pt will require ac. ICU PA will consult Vascular sx for input.   IVF hydration for contrast given to prevent further renal compromise  Case discussed with ICU PA

## 2021-10-21 NOTE — DISCHARGE NOTE PROVIDER - NSDCCPCAREPLAN_GEN_ALL_CORE_FT
PRINCIPAL DISCHARGE DIAGNOSIS  Diagnosis: Renal infarct  Assessment and Plan of Treatment: you were seen by vascular surgeon Dr Byrne and recommended to restart xarelto with close follow up with your primary vascular surgeon from [facility] for further care and management, please also follow up with your primary gynecologist for possible placement of an IUD to prevent heavy menses, also assure to follow up with your primary medical provider within 1 week of discharge for hospital discharge follow up appointment       PRINCIPAL DISCHARGE DIAGNOSIS  Diagnosis: Renal infarct  Assessment and Plan of Treatment: you were seen by vascular surgeon Dr Byrne and recommended to restart xarelto with close follow up with your primary vascular surgeon from [facility] for further care and management, please also follow up with your primary gynecologist for possible placement of an IUD to prevent heavy menses, also assure to follow up with your primary medical provider within 1 week of discharge for hospital discharge follow up appointment, you may try extra strength tylenol 1000mg to help manage your pain and oxycodone if severe to avoid nsaids, please follow up with hematology as well within 1-2 week for further evaluation and managment

## 2021-10-21 NOTE — PROVIDER CONTACT NOTE (EICU) - BACKGROUND
39 y/o female with fibromuscular dysplasia presenting with left flank pain. Found to have enhancement of the left posterior kidney possible pyelonephritis vs infarct on CT A/P.

## 2021-10-21 NOTE — CONSULT NOTE ADULT - ASSESSMENT
39 y/o F w/ PMHx Fibromuscular Dysplasia, b/l carotid artery dissections, Ulcerative proctitis, presenting with left lower quadrant abdominal pain that started yesterday around 5:30PM shortly after eating dinner.     Abdominal pain  - H/o FMD, CT abd/pelvis w/ iv cont concerning for enhancement of L posterior kidney  - concern for ?Renal artery embolism given hx  - Would possibly benefit from more dedicated renal imaging study, possibly ?Renal angiogram  - Agree with heparin gtt for now and ASA 81  - Vascular Dr. Hernandez following, would appreciate input  - Will continue to follow          41 y/o F w/ PMHx Fibromuscular Dysplasia, b/l carotid artery dissections, Ulcerative proctitis, presenting with left lower quadrant abdominal pain that started yesterday around 5:30PM shortly after eating dinner.     Abdominal pain  - H/o FMD, CT abd/pelvis w/ iv cont concerning for enhancement of L posterior kidney  - concern for ?Renal artery embolism given hx  - Would possibly benefit from more dedicated renal imaging study, possibly ?Renal angiogram, but given recent contrast study, would be judicious with additional contrast studies. This AM, Cr 0.6.   - Agree with heparin gtt for now and ASA 81  - Vascular Dr. Hernandez following, would appreciate input  - Will continue to follow          39 y/o F w/ PMHx Fibromuscular Dysplasia, b/l carotid artery dissections, Ulcerative proctitis, presenting with left lower quadrant abdominal pain that started yesterday around 5:30PM shortly after eating dinner.     Abdominal pain  - H/o FMD, CT abd/pelvis w/ iv cont concerning for enhancement of L posterior kidney  - concern for ?Renal artery embolism given hx  - Would possibly benefit from more dedicated renal imaging study, possibly ?Renal angiogram vs renal duplex study, but given recent contrast study, would be judicious with additional contrast studies. This AM, Cr 0.6.   - Agree with heparin gtt for now   - Vascular Dr. Hernandez following, would appreciate input  - Will continue to follow          41 y/o F w/ PMHx Fibromuscular Dysplasia, b/l carotid artery dissections, Ulcerative proctitis, presenting with left lower quadrant abdominal pain that started yesterday around 5:30PM shortly after eating dinner.     Abdominal pain  - H/o FMD, CT abd/pelvis w/ iv cont concerning for enhancement of L posterior kidney  - concern for ?Renal artery embolism given hx  - Would possibly benefit from more dedicated renal imaging study, possibly ?Renal angiogram vs renal duplex study, but given recent contrast study, would be judicious with additional contrast studies. This AM, Cr 0.6.   - Agree with heparin gtt for now   - would recommned holding home asa at this time  - Vascular Dr. Hernandez following, would appreciate input  - Will continue to follow          41 y/o F w/ PMHx Fibromuscular Dysplasia, b/l carotid artery dissections, Ulcerative proctitis, presenting with left lower quadrant abdominal pain that started yesterday around 5:30PM shortly after eating dinner.     Abdominal pain  - H/o FMD, CT abd/pelvis w/ iv cont concerning for enhancement of L posterior kidney  - concern for ?Renal artery embolism given hx  - Would possibly benefit from more dedicated renal imaging study, possibly ?Renal angiogram vs renal duplex study, but given recent contrast study, would be judicious with additional contrast studies. This AM, Cr 0.6.   - Agree with heparin gtt for now   - would recommend holding home asa at this time  - EKG ordered, f/u results  - Vascular Dr. Hernandez following, would appreciate input  - Will continue to follow          41 y/o F w/ PMHx Fibromuscular Dysplasia, b/l carotid artery dissections, Ulcerative proctitis, presenting with left lower quadrant abdominal pain that started yesterday around 5:30PM shortly after eating dinner.     Abdominal pain  - H/o FMD, CT abd/pelvis w/ iv cont concerning for enhancement of L posterior kidney  - concern for ?Renal artery embolism given hx  - Would possibly benefit from more dedicated renal imaging study, possibly ?Renal angiogram vs renal duplex study, but given recent contrast study, would be judicious with additional contrast studies. This AM, Cr 0.6.   - Agree with heparin gtt for now   - Pt formerly on xarelto, but stopped 2/2 heavy menses/low H/H back in 2018, would recommend GYN consult for recommendations on alternative forms of contraception to minimize blood loss going forward if pt were to need DOAC on d/c  - would recommend holding home asa at this time  - EKG sinus rhythm, short [place] interval  - Vascular Dr. Hernandez following, would appreciate input  - Will continue to follow          41 y/o F w/ PMHx Fibromuscular Dysplasia, b/l carotid artery dissections, Ulcerative proctitis, presenting with left lower quadrant abdominal pain that started yesterday around 5:30PM shortly after eating dinner.     - H/o FMD, CT abd/pelvis w/ iv cont concerning for enhancement of L posterior kidney  - concern for ?Renal artery embolism given hx  - Would possibly benefit from more dedicated renal imaging study, possibly ?Renal angiogram vs renal duplex study, but given recent contrast study, would be judicious with additional contrast studies. This AM, Cr 0.6.   - Agree with heparin gtt for now   - Pt formerly on xarelto, but stopped 2/2 heavy menses/low H/H back in 2018, would recommend GYN consult for recommendations on alternative forms of contraception to minimize blood loss going forward if pt were to need DOAC on d/c  - would recommend holding home asa at this time  - EKG sinus rhythm, short [place] interval  - Vascular Dr. Hernandez following, would appreciate input  - Will continue to follow

## 2021-10-21 NOTE — CONSULT NOTE ADULT - SUBJECTIVE AND OBJECTIVE BOX
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    In [place] until last night when she developed LLQ abd pain.  She tried simethicone and yoga poses without relief.     CT with a renal infarct.    Moderate relief of pain with opiates.     She has no ICU criteria     D/W Dr Tinajero and Renata and Dr Gibbons of the E-ICU    Needs a vascular consult ASAP to determine the need for AC and or if any additional contrast scans are needed to better look at the L renal artery and its branches.     She has received almost 3 liters of IVF so far with minimal UOP.  Though creat is only 0.6  GFR is decreased, would let vascular sx decide if another contrast study is needed.

## 2021-10-21 NOTE — H&P ADULT - ASSESSMENT
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

## 2021-10-21 NOTE — CONSULT NOTE ADULT - SUBJECTIVE AND OBJECTIVE BOX
CHARTING IN PROGRESS    HPI: 39 y/o F w/ PMHx Fibromuscular Dysplasia, b/l carotid artery dissections, Ulcerative proctitis, presenting with left lower quadrant abdominal pain that started yesterday around 5:30PM shortly after eating dinner. Describes the pain as a constant burning 10/10 pain that is non-radiating. Pain is controlled with IV morphine given here in ER. States she was on Xarelto for risk of clots, but was dc'd in 2018 after being told that she was "stable". Admits to some chills, and nausea earlier, but no dizziness, vomiting, chest pain, or shortness of breath.       Constitutional: admits to chills and nausea  HEENT: denies blurry vision, difficulty hearing  Respiratory: denies SOB, GONSALEZ, cough  Cardiovascular: denies CP, palpitations, orthopnea, PND, LE edema  Gastrointestinal: admits to left lower quadrant abd pain; denies vomiting  Genitourinary: denies urinary changes  Neurologic: denies headache, weakness, dizziness      PAST MEDICAL & SURGICAL HISTORY:  Fibromuscular Dysplasia, b/l carotid artery dissections    Rhinoplasty      Meds(Home): ASA 81mg qd, allegra    MEDICATIONS  (STANDING):  heparin  Infusion.  Unit(s)/Hr (10 mL/Hr) IV Continuous <Continuous>    MEDICATIONS  (PRN):  heparin   Injectable 4000 Unit(s) IV Push every 6 hours PRN For aPTT less than 40  heparin   Injectable 2000 Unit(s) IV Push every 6 hours PRN For aPTT between 40 - 57      SOCIAL HISTORY: No tobacco, Alcohol or Ddrug use    FAMILY HISTORY:  Mother with hx of HTN    Allergies: nkda    ECG: pending read    Vital Signs Last 24 Hrs  T(C): 37.1 (21 Oct 2021 08:05), Max: 37.1 (21 Oct 2021 08:05)  T(F): 98.8 (21 Oct 2021 08:05), Max: 98.8 (21 Oct 2021 08:05)  HR: 62 (21 Oct 2021 08:05) (62 - 92)  BP: 114/74 (21 Oct 2021 08:05) (112/76 - 125/75)  BP(mean): --  RR: 17 (21 Oct 2021 08:05) (17 - 20)  SpO2: 100% (21 Oct 2021 08:05) (96% - 100%)    Physical Exam:  General: Well developed, well nourished, NAD  HEENT: No carotid bruis noted; NCAT, PERRL, EOMI bl, moist mucous membranes   Neurology: A&Ox3, appropriately interactive  Respiratory: CTA B/L, No W/R/R  CV: RRR, +S1/S2, no murmurs, rubs or gallops  Abdominal: +mild tenderness to palpation of left lower quadrant; Soft, non-distended, +BSx4  Extremities: No C/C/E  MSK: Normal ROM, no joint erythema or warmth, no joint swelling       I&O's Detail      LABS:                        12.3   10.80 )-----------( 314      ( 21 Oct 2021 01:22 )             37.5     10    139  |  108  |  8   ----------------------------<  100<H>  4.1   |  26  |  0.64    Ca    9.7      21 Oct 2021 01:22    TPro  7.9  /  Alb  3.9  /  TBili  1.0  /  DBili  x   /  AST  26  /  ALT  27  /  AlkPhos  44  10        PT/INR - ( 21 Oct 2021 09:01 )   PT: 12.9 sec;   INR: 1.11 ratio         PTT - ( 21 Oct 2021 09:01 )  PTT:29.4 sec  Urinalysis Basic - ( 21 Oct 2021 01:22 )    Color: Yellow / Appearance: Clear / S.010 / pH: x  Gluc: x / Ketone: Negative  / Bili: Negative / Urobili: Negative   Blood: x / Protein: Negative / Nitrite: Negative   Leuk Esterase: Negative / RBC: 0-2 /HPF / WBC 0-2   Sq Epi: x / Non Sq Epi: Many / Bacteria: Many      I&O's Summary    BNP  RADIOLOGY & ADDITIONAL STUDIES:    < from: CT Abdomen and Pelvis w/ IV Cont (10.21.21 @ 03:32) >  EXAM:  CT ABDOMEN AND PELVIS IC                            PROCEDURE DATE:  10/21/2021          INTERPRETATION:  CLINICAL INFORMATION: 40 years old. Female. lower abdominal pain    r/o infection.    COMPARISON: None.    CONTRAST/COMPLICATIONS:  IV Contrast: Omnipaque 350  90 cc administered  10 cc discarded.  Oral Contrast: NONE  Complications: None reported at time of study completion    PROCEDURE:  CT of the Abdomen and Pelvis was performed.  Sagittal and coronal reformats were performed.    FINDINGS:    LOWER CHEST: Within normal limits.    LIVER: Within normal limits.  BILE DUCTS: Normal caliber.  GALLBLADDER: Within normal limits.  SPLEEN: Within normal limits.  PANCREAS: Within normal limits.  ADRENALS: Within normal limits.  KIDNEYS/URETERS: Heterogeneous enhancement of the left posterior kidney. No hydronephrosis.    BLADDER: Within normal limits.  REPRODUCTIVE ORGANS: Uterus and adnexa are grossly unremarkable. Small left adnexal cyst.    BOWEL: No bowel obstruction. Appendix is unremarkable.  PERITONEUM: No ascites.  VESSELS: Normal caliber abdominal aorta.  RETROPERITONEUM/LYMPH NODES: No lymphadenopathy.  ABDOMINAL WALL: Within normal limits.  BONES: Within normal limits.    IMPRESSION:    Heterogeneous enhancement of the left posterior kidney, favored to be due to subsegmental pyelonephritis versus less likely infarct; correlate clinically and consider follow-up imaging.    < end of copied text >   HPI: 39 y/o F w/ PMHx Fibromuscular Dysplasia, b/l carotid artery dissections, Ulcerative proctitis, presenting with left lower quadrant abdominal pain that started yesterday around 5:30PM shortly after eating dinner. Describes the pain as a constant burning 10/10 pain that is non-radiating. Pain is controlled with IV morphine given here in ER. States she was on Xarelto for risk of clots, but was dc'd in 2018 after being told that she was "stable". Admits to some chills, and nausea earlier, but no dizziness, vomiting, chest pain, or shortness of breath.       Constitutional: admits to chills and nausea  HEENT: denies blurry vision, difficulty hearing  Respiratory: denies SOB, GONSALEZ, cough  Cardiovascular: denies CP, palpitations, orthopnea, PND, LE edema  Gastrointestinal: admits to left lower quadrant abd pain; denies vomiting  Genitourinary: denies urinary changes  Neurologic: denies headache, weakness, dizziness      PAST MEDICAL & SURGICAL HISTORY:  Fibromuscular Dysplasia, b/l carotid artery dissections    Rhinoplasty      Meds(Home): ASA 81mg qd, allegra    MEDICATIONS  (STANDING):  heparin  Infusion.  Unit(s)/Hr (10 mL/Hr) IV Continuous <Continuous>    MEDICATIONS  (PRN):  heparin   Injectable 4000 Unit(s) IV Push every 6 hours PRN For aPTT less than 40  heparin   Injectable 2000 Unit(s) IV Push every 6 hours PRN For aPTT between 40 - 57      SOCIAL HISTORY: No tobacco, Alcohol or Ddrug use    FAMILY HISTORY:  Mother with hx of HTN    Allergies: nkda    ECG: pending read    Vital Signs Last 24 Hrs  T(C): 37.1 (21 Oct 2021 08:05), Max: 37.1 (21 Oct 2021 08:05)  T(F): 98.8 (21 Oct 2021 08:05), Max: 98.8 (21 Oct 2021 08:05)  HR: 62 (21 Oct 2021 08:05) (62 - 92)  BP: 114/74 (21 Oct 2021 08:05) (112/76 - 125/75)  BP(mean): --  RR: 17 (21 Oct 2021 08:05) (17 - 20)  SpO2: 100% (21 Oct 2021 08:05) (96% - 100%)    Physical Exam:  General: Well developed, well nourished, NAD  HEENT: No carotid bruis noted; NCAT, PERRL, EOMI bl, moist mucous membranes   Neurology: A&Ox3, appropriately interactive  Respiratory: CTA B/L, No W/R/R  CV: RRR, +S1/S2, no murmurs, rubs or gallops  Abdominal: +mild tenderness to palpation of left lower quadrant; Soft, non-distended, +BSx4  Extremities: No C/C/E  MSK: Normal ROM, no joint erythema or warmth, no joint swelling       I&O's Detail      LABS:                        12.3   10.80 )-----------( 314      ( 21 Oct 2021 01:22 )             37.5     10    139  |  108  |  8   ----------------------------<  100<H>  4.1   |  26  |  0.64    Ca    9.7      21 Oct 2021 01:22    TPro  7.9  /  Alb  3.9  /  TBili  1.0  /  DBili  x   /  AST  26  /  ALT  27  /  AlkPhos  44  10-        PT/INR - ( 21 Oct 2021 09:01 )   PT: 12.9 sec;   INR: 1.11 ratio         PTT - ( 21 Oct 2021 09:01 )  PTT:29.4 sec  Urinalysis Basic - ( 21 Oct 2021 01:22 )    Color: Yellow / Appearance: Clear / S.010 / pH: x  Gluc: x / Ketone: Negative  / Bili: Negative / Urobili: Negative   Blood: x / Protein: Negative / Nitrite: Negative   Leuk Esterase: Negative / RBC: 0-2 /HPF / WBC 0-2   Sq Epi: x / Non Sq Epi: Many / Bacteria: Many      I&O's Summary    BNP  RADIOLOGY & ADDITIONAL STUDIES:    < from: CT Abdomen and Pelvis w/ IV Cont (10.21.21 @ 03:32) >  EXAM:  CT ABDOMEN AND PELVIS IC                            PROCEDURE DATE:  10/21/2021          INTERPRETATION:  CLINICAL INFORMATION: 40 years old. Female. lower abdominal pain    r/o infection.    COMPARISON: None.    CONTRAST/COMPLICATIONS:  IV Contrast: Omnipaque 350  90 cc administered  10 cc discarded.  Oral Contrast: NONE  Complications: None reported at time of study completion    PROCEDURE:  CT of the Abdomen and Pelvis was performed.  Sagittal and coronal reformats were performed.    FINDINGS:    LOWER CHEST: Within normal limits.    LIVER: Within normal limits.  BILE DUCTS: Normal caliber.  GALLBLADDER: Within normal limits.  SPLEEN: Within normal limits.  PANCREAS: Within normal limits.  ADRENALS: Within normal limits.  KIDNEYS/URETERS: Heterogeneous enhancement of the left posterior kidney. No hydronephrosis.    BLADDER: Within normal limits.  REPRODUCTIVE ORGANS: Uterus and adnexa are grossly unremarkable. Small left adnexal cyst.    BOWEL: No bowel obstruction. Appendix is unremarkable.  PERITONEUM: No ascites.  VESSELS: Normal caliber abdominal aorta.  RETROPERITONEUM/LYMPH NODES: No lymphadenopathy.  ABDOMINAL WALL: Within normal limits.  BONES: Within normal limits.    IMPRESSION:    Heterogeneous enhancement of the left posterior kidney, favored to be due to subsegmental pyelonephritis versus less likely infarct; correlate clinically and consider follow-up imaging.    < end of copied text >   HPI: 39 y/o F w/ PMHx Fibromuscular Dysplasia, b/l carotid artery dissections, Ulcerative proctitis, presenting with left lower quadrant abdominal pain that started yesterday around 5:30PM shortly after eating dinner. Describes the pain as a constant burning 10/10 pain that is non-radiating. Pain is controlled with IV morphine given here in ER. States she was on Xarelto for risk of clots, but was dc'd in 2018 after having heavy menses/low H/H, and being told that she was "stable" enough to not be on it anymore. Admits to some chills, and nausea earlier, but no dizziness, vomiting, chest pain, or shortness of breath.       Constitutional: admits to chills and nausea  HEENT: denies blurry vision, difficulty hearing  Respiratory: denies SOB, GONSALEZ, cough  Cardiovascular: denies CP, palpitations, orthopnea, PND, LE edema  Gastrointestinal: admits to left lower quadrant abd pain; denies vomiting  Genitourinary: denies urinary changes  Neurologic: denies headache, weakness, dizziness      PAST MEDICAL & SURGICAL HISTORY:  Fibromuscular Dysplasia, b/l carotid artery dissections    Rhinoplasty      Meds(Home): ASA 81mg qd, allegra    MEDICATIONS  (STANDING):  heparin  Infusion.  Unit(s)/Hr (10 mL/Hr) IV Continuous <Continuous>    MEDICATIONS  (PRN):  heparin   Injectable 4000 Unit(s) IV Push every 6 hours PRN For aPTT less than 40  heparin   Injectable 2000 Unit(s) IV Push every 6 hours PRN For aPTT between 40 - 57      SOCIAL HISTORY: No tobacco, Alcohol or Ddrug use    FAMILY HISTORY:  Mother with hx of HTN    Allergies: nkda    ECG: Sinus rhythm, short VA interval    Vital Signs Last 24 Hrs  T(C): 37.1 (21 Oct 2021 08:05), Max: 37.1 (21 Oct 2021 08:05)  T(F): 98.8 (21 Oct 2021 08:05), Max: 98.8 (21 Oct 2021 08:05)  HR: 62 (21 Oct 2021 08:05) (62 - 92)  BP: 114/74 (21 Oct 2021 08:05) (112/76 - 125/75)  BP(mean): --  RR: 17 (21 Oct 2021 08:05) (17 - 20)  SpO2: 100% (21 Oct 2021 08:05) (96% - 100%)    Physical Exam:  General: Well developed, well nourished, NAD  HEENT: No carotid bruis noted; NCAT, PERRL, EOMI bl, moist mucous membranes   Neurology: A&Ox3, appropriately interactive  Respiratory: CTA B/L, No W/R/R  CV: RRR, +S1/S2, no murmurs, rubs or gallops  Abdominal: +mild tenderness to palpation of left lower quadrant; Soft, non-distended, +BSx4  Extremities: No C/C/E  MSK: Normal ROM, no joint erythema or warmth, no joint swelling       I&O's Detail      LABS:                        12.3   10.80 )-----------( 314      ( 21 Oct 2021 01:22 )             37.5     10    139  |  108  |  8   ----------------------------<  100<H>  4.1   |  26  |  0.64    Ca    9.7      21 Oct 2021 01:22    TPro  7.9  /  Alb  3.9  /  TBili  1.0  /  DBili  x   /  AST  26  /  ALT  27  /  AlkPhos  44  10        PT/INR - ( 21 Oct 2021 09:01 )   PT: 12.9 sec;   INR: 1.11 ratio         PTT - ( 21 Oct 2021 09:01 )  PTT:29.4 sec  Urinalysis Basic - ( 21 Oct 2021 01:22 )    Color: Yellow / Appearance: Clear / S.010 / pH: x  Gluc: x / Ketone: Negative  / Bili: Negative / Urobili: Negative   Blood: x / Protein: Negative / Nitrite: Negative   Leuk Esterase: Negative / RBC: 0-2 /HPF / WBC 0-2   Sq Epi: x / Non Sq Epi: Many / Bacteria: Many      I&O's Summary    BNP  RADIOLOGY & ADDITIONAL STUDIES:    < from: CT Abdomen and Pelvis w/ IV Cont (10.21.21 @ 03:32) >  EXAM:  CT ABDOMEN AND PELVIS IC                            PROCEDURE DATE:  10/21/2021          INTERPRETATION:  CLINICAL INFORMATION: 40 years old. Female. lower abdominal pain    r/o infection.    COMPARISON: None.    CONTRAST/COMPLICATIONS:  IV Contrast: Omnipaque 350  90 cc administered  10 cc discarded.  Oral Contrast: NONE  Complications: None reported at time of study completion    PROCEDURE:  CT of the Abdomen and Pelvis was performed.  Sagittal and coronal reformats were performed.    FINDINGS:    LOWER CHEST: Within normal limits.    LIVER: Within normal limits.  BILE DUCTS: Normal caliber.  GALLBLADDER: Within normal limits.  SPLEEN: Within normal limits.  PANCREAS: Within normal limits.  ADRENALS: Within normal limits.  KIDNEYS/URETERS: Heterogeneous enhancement of the left posterior kidney. No hydronephrosis.    BLADDER: Within normal limits.  REPRODUCTIVE ORGANS: Uterus and adnexa are grossly unremarkable. Small left adnexal cyst.    BOWEL: No bowel obstruction. Appendix is unremarkable.  PERITONEUM: No ascites.  VESSELS: Normal caliber abdominal aorta.  RETROPERITONEUM/LYMPH NODES: No lymphadenopathy.  ABDOMINAL WALL: Within normal limits.  BONES: Within normal limits.    IMPRESSION:    Heterogeneous enhancement of the left posterior kidney, favored to be due to subsegmental pyelonephritis versus less likely infarct; correlate clinically and consider follow-up imaging.    < end of copied text >   HPI: 41 y/o F w/ PMHx Fibromuscular Dysplasia, b/l carotid artery dissections, Ulcerative proctitis, presenting with left lower quadrant abdominal pain that started yesterday around 5:30PM shortly after eating dinner. Describes the pain as a constant burning 10/10 pain that is non-radiating. Pain is controlled with IV morphine given here in ER. States she was on Xarelto for risk of clots, but was dc'd in 2018 after having heavy menses/low H/H, and being told that she was "stable" enough to not be on it anymore. Admits to some chills, and nausea earlier, but no dizziness, vomiting, chest pain, or shortness of breath.       Constitutional: admits to chills and nausea  HEENT: denies blurry vision, difficulty hearing  Respiratory: denies SOB, GONSALEZ, cough  Cardiovascular: denies CP, palpitations, orthopnea, PND, LE edema  Gastrointestinal: admits to left lower quadrant abd pain; denies vomiting  Genitourinary: denies urinary changes  Neurologic: denies headache, weakness, dizziness      PAST MEDICAL & SURGICAL HISTORY:  Fibromuscular Dysplasia, b/l carotid artery dissections  Rhinoplasty      Meds(Home): ASA 81mg qd, allegra    MEDICATIONS  (STANDING):  heparin  Infusion.  Unit(s)/Hr (10 mL/Hr) IV Continuous <Continuous>    MEDICATIONS  (PRN):  heparin   Injectable 4000 Unit(s) IV Push every 6 hours PRN For aPTT less than 40  heparin   Injectable 2000 Unit(s) IV Push every 6 hours PRN For aPTT between 40 - 57      SOCIAL HISTORY: No tobacco, Alcohol or Ddrug use    FAMILY HISTORY:  Mother with hx of HTN  No scd or early CAD     Allergies: nkda    ECG: Sinus rhythm, short NE interval    Vital Signs Last 24 Hrs  T(C): 37.1 (21 Oct 2021 08:05), Max: 37.1 (21 Oct 2021 08:05)  T(F): 98.8 (21 Oct 2021 08:05), Max: 98.8 (21 Oct 2021 08:05)  HR: 62 (21 Oct 2021 08:05) (62 - 92)  BP: 114/74 (21 Oct 2021 08:05) (112/76 - 125/75)  BP(mean): --  RR: 17 (21 Oct 2021 08:05) (17 - 20)  SpO2: 100% (21 Oct 2021 08:05) (96% - 100%)    Physical Exam:  General: Well developed, well nourished, NAD  HEENT:  + faint carotid bruits noted; NCAT, PERRL, EOMI bl, moist mucous membranes   Neurology: A&Ox3, appropriately interactive  Respiratory: CTA B/L, No W/R/R  CV: RRR, +S1/S2, no murmurs, rubs or gallops  Abdominal: +mild tenderness to palpation of left lower quadrant; Soft, non-distended, +BSx4  Extremities: No C/C/E  MSK: Normal ROM, no joint erythema or warmth, no joint swelling   psych appropriate mood and affect       I&O's Detail      LABS:                        12.3   10.80 )-----------( 314      ( 21 Oct 2021 01:22 )             37.5     10-    139  |  108  |  8   ----------------------------<  100<H>  4.1   |  26  |  0.64    Ca    9.7      21 Oct 2021 01:22    TPro  7.9  /  Alb  3.9  /  TBili  1.0  /  DBili  x   /  AST  26  /  ALT  27  /  AlkPhos  44  10-21        PT/INR - ( 21 Oct 2021 09:01 )   PT: 12.9 sec;   INR: 1.11 ratio         PTT - ( 21 Oct 2021 09:01 )  PTT:29.4 sec  Urinalysis Basic - ( 21 Oct 2021 01:22 )    Color: Yellow / Appearance: Clear / S.010 / pH: x  Gluc: x / Ketone: Negative  / Bili: Negative / Urobili: Negative   Blood: x / Protein: Negative / Nitrite: Negative   Leuk Esterase: Negative / RBC: 0-2 /HPF / WBC 0-2   Sq Epi: x / Non Sq Epi: Many / Bacteria: Many      I&O's Summary    BNP  RADIOLOGY & ADDITIONAL STUDIES:    < from: CT Abdomen and Pelvis w/ IV Cont (10.21.21 @ 03:32) >  EXAM:  CT ABDOMEN AND PELVIS IC                            PROCEDURE DATE:  10/21/2021          INTERPRETATION:  CLINICAL INFORMATION: 40 years old. Female. lower abdominal pain    r/o infection.    COMPARISON: None.    CONTRAST/COMPLICATIONS:  IV Contrast: Omnipaque 350  90 cc administered  10 cc discarded.  Oral Contrast: NONE  Complications: None reported at time of study completion    PROCEDURE:  CT of the Abdomen and Pelvis was performed.  Sagittal and coronal reformats were performed.    FINDINGS:    LOWER CHEST: Within normal limits.    LIVER: Within normal limits.  BILE DUCTS: Normal caliber.  GALLBLADDER: Within normal limits.  SPLEEN: Within normal limits.  PANCREAS: Within normal limits.  ADRENALS: Within normal limits.  KIDNEYS/URETERS: Heterogeneous enhancement of the left posterior kidney. No hydronephrosis.    BLADDER: Within normal limits.  REPRODUCTIVE ORGANS: Uterus and adnexa are grossly unremarkable. Small left adnexal cyst.    BOWEL: No bowel obstruction. Appendix is unremarkable.  PERITONEUM: No ascites.  VESSELS: Normal caliber abdominal aorta.  RETROPERITONEUM/LYMPH NODES: No lymphadenopathy.  ABDOMINAL WALL: Within normal limits.  BONES: Within normal limits.    IMPRESSION:    Heterogeneous enhancement of the left posterior kidney, favored to be due to subsegmental pyelonephritis versus less likely infarct; correlate clinically and consider follow-up imaging.    < end of copied text >

## 2021-10-21 NOTE — ED ADULT NURSE REASSESSMENT NOTE - NS ED NURSE REASSESS COMMENT FT1
Pt discharged home, discharge papers with instructions were given to pt by LLOYD Beckwith, joselin removed and pt left ambulatory and in stable condition. Pt was accompanied by .

## 2021-10-21 NOTE — H&P ADULT - NSHPSOCIALHISTORY_GEN_ALL_CORE
, lives in a house, drinks etoh sparingly/socially/occasionally and not much, denies ilicit drug use; pt is a flex RN for [employer], states she is interested in FLU shot prior to discharge, COVID Vaccinated with Moderna 2nd dose on 2/14/2021

## 2021-10-21 NOTE — H&P ADULT - HISTORY OF PRESENT ILLNESS
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 Pt states her pain in her LLQ and can at time be nauseous from it.  Describes the pain as a constant burning 10/10 pain that is non-radiating.Pt was on xarelto but had stopped due to heavy menses? Pt states she had CTA less than 8 weeks ago which was unremarkable as per her outpt vascular surgery Dr.     In the ED pt underwent CT abdomen and pelvis with contrast which was reported as: Heterogeneous enhancement of the left posterior kidney. Vascular surger was consulted in ED as well and left management recommendations.  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 Pt states her pain in her LLQ and can at time be nauseous from it.  Describes the pain as a constant burning 10/10 pain that is non-radiating.Pt was on xarelto but had stopped due to heavy menses as well completing 6 month treatment for prev emboli.  Pt has been surveyed by vascular outpatient and  states she had CTA less than 8 weeks ago which was unremarkable as per her outpt vascular surgery     In the ED pt underwent CT abdomen and pelvis with contrast which was reported as: Heterogeneous enhancement of the left posterior kidney. Vascular surgery was consulted in ED as well and left management recommendations including starting heparin gtt

## 2021-10-21 NOTE — ED PROVIDER NOTE - SIGNIFICANT NEGATIVE FINDINGS
, no nausea, no vomiting   no fever, no chills, no urinary symptoms, no CP, no SOB, no stroke symptoms.

## 2021-10-21 NOTE — ED ADULT NURSE REASSESSMENT NOTE - NS ED NURSE REASSESS COMMENT FT1
Received pt in bed A/O x 4, breathing spontaneously on room air, in no apparent distress, vital signs stable. Pt pending vascular consult, nursing care ongoing and safety maintained.

## 2021-10-21 NOTE — DISCHARGE NOTE PROVIDER - NSDCMRMEDTOKEN_GEN_ALL_CORE_FT
Allegra 24 Hour Allergy oral tablet: 1 tab(s) orally once a day  Multiple Vitamins with Iron oral tablet: 1 tab(s) orally once a day  ondansetron 4 mg oral tablet: 1 tab(s) orally every 8 hours, As needed, Nausea and/or Vomiting  oxyCODONE 5 mg oral tablet: 1 tab(s) orally every 6 hours, As Needed -for severe pain MDD:4 tabs   work excuse: To whom it may concern:    Above pt is currently being hospitalized under my care for an acute medical issue. Please excuse her during her time of absence, Thank you for your cooperation during her time of illness and recovery.   Xarelto Starter Pack 15 mg-20 mg oral kit: 1 packet(s) orally 2 times a day  (15 mg BID po for 21 days and then 20mg daily thereafter)

## 2021-10-21 NOTE — CONSULT NOTE ADULT - ATTENDING COMMENTS
Patient evaluated at the bedside. CT Abd Pelvis w IV contrast reviewed. Evidence of string of beads affecting L RA. No evidence of dissection but test is not detailed enough to evaluate it. Good distal artery flow. L sided abdominal pain 5/10.  Improved from yesterday night. No renal failure. No HTN. Discussed case with Dr. Luu at [facility].  We agreed on Xarelto. Patient will follow up with her next week. Patient feels she can go home and pain is under control. Will likely need a combination of Percocet and Tylenol to avoid NSAIDs
Patient still complain abdominal pain.  She may have a left renal lesion in relation to her FMD.  This is probably the cause of her pain.  At this time I would suspect that this is may be related to a thrombosis as a sequela I of her FMD.  She most likely will be requiring long-term anticoagulation.  She has a history of heavy menstrual bleeding which is why her previous anticoagulation was stopped.  I would suggest calling GYN to see if OCPs would be indicated to prevent her menstrual cycle.  I would continue her on heparin drip and transition her to a DOAC in the near future.  She may need more dedicated imaging of her renals.  And will defer this to vascular.    No signs of ischemia or volume overload.  EKG without ischemic changes.  May need an outpatient Holter monitor to rule out occult A. fib though this is less likely to be the etiology of her current situation.  Monitor on telemetry.  We will follow up with you.

## 2021-10-21 NOTE — ED PROVIDER NOTE - OBJECTIVE STATEMENT
LLQ ABD PAIN STARTING AT 1730, neg n/v/d  abdominal pain LLQ ABD PAIN STARTING AT 1730, neg n/v/d  h/o fibromuscular dysphasia, carotid dissections, cerebral dissections 39 y/o female h/o fibromuscular dysphasia, carotid dissections and  cerebral dissections C/C sudden  LLQ ABD PAIN STARTING AT 1730, no nausea, no vomiting   no fever, no chills, no urinary symptoms, no CP, no SOB, no stroke symptoms.

## 2021-10-21 NOTE — CONSULT NOTE ADULT - PROBLEM SELECTOR RECOMMENDATION 9
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 Labs unremarkable and vitals stable at present.  - Will discuss findings of CT with radiology and determine if CTA of chest is warranted to r/o aorta as a potential source.   - Start Hep gtt  - Patient will need embolic work-up in the meantime including cardio consults and echo  - Case discussed with Dr. Love

## 2021-11-29 PROBLEM — J30.2 OTHER SEASONAL ALLERGIC RHINITIS: Chronic | Status: ACTIVE | Noted: 2021-10-21

## 2021-11-29 PROBLEM — Z87.39 PERSONAL HISTORY OF OTHER DISEASES OF THE MUSCULOSKELETAL SYSTEM AND CONNECTIVE TISSUE: Chronic | Status: ACTIVE | Noted: 2021-10-21

## 2021-11-29 PROBLEM — K51.20 ULCERATIVE (CHRONIC) PROCTITIS WITHOUT COMPLICATIONS: Chronic | Status: ACTIVE | Noted: 2021-10-21

## 2021-12-03 ENCOUNTER — APPOINTMENT (OUTPATIENT)
Dept: ORTHOPEDIC SURGERY | Facility: CLINIC | Age: 41
End: 2021-12-03

## 2021-12-09 ENCOUNTER — APPOINTMENT (OUTPATIENT)
Dept: ORTHOPEDIC SURGERY | Facility: CLINIC | Age: 41
End: 2021-12-09

## 2021-12-23 ENCOUNTER — APPOINTMENT (OUTPATIENT)
Dept: PAIN MANAGEMENT | Facility: CLINIC | Age: 41
End: 2021-12-23

## 2021-12-26 ENCOUNTER — EMERGENCY (EMERGENCY)
Facility: HOSPITAL | Age: 41
LOS: 1 days | End: 2021-12-26
Payer: COMMERCIAL

## 2021-12-26 VITALS
RESPIRATION RATE: 16 BRPM | TEMPERATURE: 98 F | DIASTOLIC BLOOD PRESSURE: 99 MMHG | HEART RATE: 61 BPM | WEIGHT: 115.08 LBS | OXYGEN SATURATION: 100 % | HEIGHT: 65 IN | SYSTOLIC BLOOD PRESSURE: 163 MMHG

## 2021-12-26 DIAGNOSIS — Z98.890 OTHER SPECIFIED POSTPROCEDURAL STATES: Chronic | ICD-10-CM

## 2021-12-26 DIAGNOSIS — K08.409 PARTIAL LOSS OF TEETH, UNSPECIFIED CAUSE, UNSPECIFIED CLASS: Chronic | ICD-10-CM

## 2021-12-26 PROCEDURE — L9991: CPT

## 2021-12-26 PROCEDURE — 93005 ELECTROCARDIOGRAM TRACING: CPT

## 2022-01-07 ENCOUNTER — APPOINTMENT (OUTPATIENT)
Dept: CARDIOLOGY | Facility: CLINIC | Age: 42
End: 2022-01-07
Payer: COMMERCIAL

## 2022-01-07 ENCOUNTER — NON-APPOINTMENT (OUTPATIENT)
Age: 42
End: 2022-01-07

## 2022-01-07 VITALS
HEART RATE: 65 BPM | OXYGEN SATURATION: 100 % | SYSTOLIC BLOOD PRESSURE: 104 MMHG | DIASTOLIC BLOOD PRESSURE: 64 MMHG | HEIGHT: 65 IN | WEIGHT: 115 LBS | BODY MASS INDEX: 19.16 KG/M2

## 2022-01-07 DIAGNOSIS — I82.409 ACUTE EMBOLISM AND THROMBOSIS OF UNSPECIFIED DEEP VEINS OF UNSPECIFIED LOWER EXTREMITY: ICD-10-CM

## 2022-01-07 DIAGNOSIS — R00.2 PALPITATIONS: ICD-10-CM

## 2022-01-07 DIAGNOSIS — I47.1 SUPRAVENTRICULAR TACHYCARDIA: ICD-10-CM

## 2022-01-07 DIAGNOSIS — R07.9 CHEST PAIN, UNSPECIFIED: ICD-10-CM

## 2022-01-07 PROCEDURE — 36415 COLL VENOUS BLD VENIPUNCTURE: CPT

## 2022-01-07 PROCEDURE — 99205 OFFICE O/P NEW HI 60 MIN: CPT

## 2022-01-07 PROCEDURE — 93000 ELECTROCARDIOGRAM COMPLETE: CPT

## 2022-01-07 RX ORDER — WARFARIN 5 MG/1
5 TABLET ORAL
Qty: 30 | Refills: 2 | Status: DISCONTINUED | COMMUNITY
Start: 2017-09-21 | End: 2022-01-07

## 2022-01-08 LAB
BASOPHILS # BLD AUTO: 0.04 K/UL
BASOPHILS NFR BLD AUTO: 0.9 %
EOSINOPHIL # BLD AUTO: 0.22 K/UL
EOSINOPHIL NFR BLD AUTO: 5 %
HCT VFR BLD CALC: 41.1 %
HGB BLD-MCNC: 13 G/DL
IMM GRANULOCYTES NFR BLD AUTO: 0.5 %
LYMPHOCYTES # BLD AUTO: 1.49 K/UL
LYMPHOCYTES NFR BLD AUTO: 34.2 %
MAN DIFF?: NORMAL
MCHC RBC-ENTMCNC: 28.9 PG
MCHC RBC-ENTMCNC: 31.6 GM/DL
MCV RBC AUTO: 91.3 FL
MONOCYTES # BLD AUTO: 0.59 K/UL
MONOCYTES NFR BLD AUTO: 13.5 %
NEUTROPHILS # BLD AUTO: 2 K/UL
NEUTROPHILS NFR BLD AUTO: 45.9 %
PLATELET # BLD AUTO: 340 K/UL
RBC # BLD: 4.5 M/UL
RBC # FLD: 13.4 %
TSH SERPL-ACNC: 0.77 UIU/ML
WBC # FLD AUTO: 4.36 K/UL

## 2022-01-10 PROBLEM — R00.2 HEART PALPITATIONS: Status: ACTIVE | Noted: 2022-01-10

## 2022-01-10 PROBLEM — I47.1 ATRIAL TACHYCARDIA: Status: ACTIVE | Noted: 2022-01-10

## 2022-01-10 PROBLEM — I47.1 SUPRAVENTRICULAR TACHYCARDIA: Status: ACTIVE | Noted: 2022-01-10

## 2022-01-10 RX ORDER — CARVEDILOL 25 MG/1
25 TABLET, FILM COATED ORAL
Qty: 180 | Refills: 3 | Status: ACTIVE | COMMUNITY
Start: 2022-01-10 | End: 1900-01-01

## 2022-01-12 ENCOUNTER — OUTPATIENT (OUTPATIENT)
Dept: OUTPATIENT SERVICES | Facility: HOSPITAL | Age: 42
LOS: 1 days | End: 2022-01-12
Payer: COMMERCIAL

## 2022-01-12 ENCOUNTER — APPOINTMENT (OUTPATIENT)
Dept: CV DIAGNOSITCS | Facility: HOSPITAL | Age: 42
End: 2022-01-12

## 2022-01-12 DIAGNOSIS — Z98.890 OTHER SPECIFIED POSTPROCEDURAL STATES: Chronic | ICD-10-CM

## 2022-01-12 DIAGNOSIS — K08.409 PARTIAL LOSS OF TEETH, UNSPECIFIED CAUSE, UNSPECIFIED CLASS: Chronic | ICD-10-CM

## 2022-01-12 DIAGNOSIS — R07.9 CHEST PAIN, UNSPECIFIED: ICD-10-CM

## 2022-01-12 PROCEDURE — 93306 TTE W/DOPPLER COMPLETE: CPT | Mod: 26

## 2022-01-20 ENCOUNTER — APPOINTMENT (OUTPATIENT)
Dept: CARDIOLOGY | Facility: CLINIC | Age: 42
End: 2022-01-20

## 2022-02-01 ENCOUNTER — APPOINTMENT (OUTPATIENT)
Dept: CARDIOLOGY | Facility: CLINIC | Age: 42
End: 2022-02-01

## 2022-07-07 ENCOUNTER — TRANSCRIPTION ENCOUNTER (OUTPATIENT)
Age: 42
End: 2022-07-07

## 2022-07-29 ENCOUNTER — APPOINTMENT (OUTPATIENT)
Dept: ULTRASOUND IMAGING | Facility: CLINIC | Age: 42
End: 2022-07-29

## 2022-07-29 ENCOUNTER — OUTPATIENT (OUTPATIENT)
Dept: OUTPATIENT SERVICES | Facility: HOSPITAL | Age: 42
LOS: 1 days | End: 2022-07-29
Payer: COMMERCIAL

## 2022-07-29 ENCOUNTER — APPOINTMENT (OUTPATIENT)
Dept: MAMMOGRAPHY | Facility: CLINIC | Age: 42
End: 2022-07-29

## 2022-07-29 DIAGNOSIS — Z98.890 OTHER SPECIFIED POSTPROCEDURAL STATES: Chronic | ICD-10-CM

## 2022-07-29 DIAGNOSIS — Z12.31 ENCOUNTER FOR SCREENING MAMMOGRAM FOR MALIGNANT NEOPLASM OF BREAST: ICD-10-CM

## 2022-07-29 DIAGNOSIS — K08.409 PARTIAL LOSS OF TEETH, UNSPECIFIED CAUSE, UNSPECIFIED CLASS: Chronic | ICD-10-CM

## 2022-07-29 DIAGNOSIS — Z00.8 ENCOUNTER FOR OTHER GENERAL EXAMINATION: ICD-10-CM

## 2022-07-29 PROCEDURE — 77063 BREAST TOMOSYNTHESIS BI: CPT | Mod: 26

## 2022-07-29 PROCEDURE — 76641 ULTRASOUND BREAST COMPLETE: CPT | Mod: 26,50

## 2022-07-29 PROCEDURE — 77067 SCR MAMMO BI INCL CAD: CPT

## 2022-07-29 PROCEDURE — 76641 ULTRASOUND BREAST COMPLETE: CPT

## 2022-07-29 PROCEDURE — 77067 SCR MAMMO BI INCL CAD: CPT | Mod: 26

## 2022-07-29 PROCEDURE — 77063 BREAST TOMOSYNTHESIS BI: CPT

## 2022-08-02 ENCOUNTER — APPOINTMENT (OUTPATIENT)
Dept: PAIN MANAGEMENT | Facility: CLINIC | Age: 42
End: 2022-08-02

## 2022-08-02 VITALS
DIASTOLIC BLOOD PRESSURE: 78 MMHG | BODY MASS INDEX: 19.83 KG/M2 | WEIGHT: 119 LBS | HEART RATE: 55 BPM | HEIGHT: 65 IN | SYSTOLIC BLOOD PRESSURE: 117 MMHG

## 2022-08-02 PROCEDURE — 99204 OFFICE O/P NEW MOD 45 MIN: CPT

## 2022-08-02 RX ORDER — RIVAROXABAN 20 MG/1
20 TABLET, FILM COATED ORAL
Qty: 90 | Refills: 3 | Status: DISCONTINUED | COMMUNITY
Start: 2022-01-07 | End: 2022-08-02

## 2022-08-02 RX ORDER — ENOXAPARIN SODIUM 100 MG/ML
60 INJECTION SUBCUTANEOUS
Qty: 2 | Refills: 0 | Status: DISCONTINUED | COMMUNITY
Start: 2017-09-21 | End: 2022-08-02

## 2022-08-02 RX ORDER — RIZATRIPTAN BENZOATE 10 MG/1
10 TABLET ORAL
Qty: 6 | Refills: 1 | Status: DISCONTINUED | COMMUNITY
Start: 2017-08-29 | End: 2022-08-02

## 2022-08-04 NOTE — PHYSICAL EXAM
[General Appearance - Alert] : alert [General Appearance - Well-Appearing] : healthy appearing [Oriented To Time, Place, And Person] : oriented to person, place, and time [Affect] : the affect was normal [Mood] : the mood was normal [Cranial Nerves Facial (VII)] : face symmetrical [Cranial Nerves Accessory (XI - Cranial And Spinal)] : head turning and shoulder shrug symmetric [Cranial Nerves Hypoglossal (XII)] : there was no tongue deviation with protrusion [Motor Strength] : muscle strength was normal in all four extremities [2+] : Brachioradialis left 2+ [Sclera] : the sclera and conjunctiva were normal [PERRL With Normal Accommodation] : pupils were equal in size, round, reactive to light, with normal accommodation [Extraocular Movements] : extraocular movements were intact [] : no respiratory distress [Edema] : there was no peripheral edema [Paresis Pronator Drift Right-Sided] : no pronator drift on the right [Paresis Pronator Drift Left-Sided] : no pronator drift on the left [Motor Strength Upper Extremities Bilaterally] : strength was normal in both upper extremities [Motor Strength Lower Extremities Bilaterally] : strength was normal in both lower extremities [Coordination - Dysmetria Impaired Finger-to-Nose Bilateral] : not present

## 2022-08-04 NOTE — REVIEW OF SYSTEMS
solids clears [Fever] : no fever [Chills] : no chills [Chest Pain] : no chest pain [Palpitations] : no palpitations [Fainting] : no fainting

## 2022-08-04 NOTE — HISTORY OF PRESENT ILLNESS
[Headache] : headache [Dizziness] : dizziness [Photophobia] : photophobia [Phonophobia] : phonophobia [Neck Pain] : neck pain [> 72 hours] : > 72 hours [FreeTextEntry1] : Patient is here today for a consultation for headaches.  \par Explains she has had headaches and migraines "my whole life ". \par \par In 2017  had spontaneous disection of bilateral internal carotid arteries - admitted  to [facility]  upon review of imaging that showed possible disection of bilateral internal carotid  arteries . Required angiogram . She was then on Coumadin and then  Xerelto  . 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 \par Now on Carvedilol 12.5mg BID and Baby ASA 1x per day.  \par Patient has not had a significant headache since May 2022. Although she avoids NSAIDs Cambia has been helpful .Tylenol is not helpful. \par A headache/ migraine can last upto 14 days . \par \par Pt is fearful of next headache \par Clonazepam .25mg has been helpful . \par Pt exercises regularly - has been given parameters to keep HR 120s and below. \par She limits caffeine .\par rarely  drinks alcohol - does find it to trigger headache\par Does not smoke\par  When she has a headache feels hungry, sensitive to light and sound  , brain fog , dizzy and anxious . 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 \par \par Next MRA brain and Kidney planned for 10/17/22. \par \par Was on Propranolol in past - did not feel well.\par Has not been on Topamax - does not wish to be on a daily med.  [de-identified] : hunger, brain fog ,anxious

## 2022-08-04 NOTE — ASSESSMENT
[FreeTextEntry1] : 41 year old woman with hx of carotid artery disection and renal artery discretion , with migraine . \par Triptans are contraindicated , NSAIDs should be avoided .\par Trial of Nurtec. \par \par Dr Price on site , billed incident to service.

## 2022-08-15 ENCOUNTER — APPOINTMENT (OUTPATIENT)
Dept: DERMATOLOGY | Facility: CLINIC | Age: 42
End: 2022-08-15

## 2022-08-15 DIAGNOSIS — L82.1 OTHER SEBORRHEIC KERATOSIS: ICD-10-CM

## 2022-08-15 DIAGNOSIS — Z12.83 ENCOUNTER FOR SCREENING FOR MALIGNANT NEOPLASM OF SKIN: ICD-10-CM

## 2022-08-15 DIAGNOSIS — Z85.828 PERSONAL HISTORY OF OTHER MALIGNANT NEOPLASM OF SKIN: ICD-10-CM

## 2022-08-15 PROCEDURE — 99204 OFFICE O/P NEW MOD 45 MIN: CPT

## 2022-11-03 ENCOUNTER — APPOINTMENT (OUTPATIENT)
Dept: ORTHOPEDIC SURGERY | Facility: CLINIC | Age: 42
End: 2022-11-03

## 2022-11-03 VITALS
BODY MASS INDEX: 19.99 KG/M2 | HEART RATE: 60 BPM | WEIGHT: 120 LBS | TEMPERATURE: 97.3 F | DIASTOLIC BLOOD PRESSURE: 86 MMHG | HEIGHT: 65 IN | SYSTOLIC BLOOD PRESSURE: 131 MMHG

## 2022-11-03 DIAGNOSIS — G89.29 PAIN IN RIGHT HIP: ICD-10-CM

## 2022-11-03 DIAGNOSIS — M25.859 OTHER SPECIFIED JOINT DISORDERS, UNSPECIFIED HIP: ICD-10-CM

## 2022-11-03 DIAGNOSIS — M25.552 PAIN IN RIGHT HIP: ICD-10-CM

## 2022-11-03 DIAGNOSIS — M25.551 PAIN IN RIGHT HIP: ICD-10-CM

## 2022-11-03 PROCEDURE — 99204 OFFICE O/P NEW MOD 45 MIN: CPT

## 2022-11-03 PROCEDURE — 73521 X-RAY EXAM HIPS BI 2 VIEWS: CPT

## 2022-11-14 ENCOUNTER — APPOINTMENT (OUTPATIENT)
Dept: DERMATOLOGY | Facility: CLINIC | Age: 42
End: 2022-11-14

## 2022-11-14 DIAGNOSIS — L28.0 LICHEN SIMPLEX CHRONICUS: ICD-10-CM

## 2022-11-14 DIAGNOSIS — L91.0 HYPERTROPHIC SCAR: ICD-10-CM

## 2022-11-14 DIAGNOSIS — L71.9 ROSACEA, UNSPECIFIED: ICD-10-CM

## 2022-11-14 PROCEDURE — 99214 OFFICE O/P EST MOD 30 MIN: CPT

## 2022-12-08 ENCOUNTER — APPOINTMENT (OUTPATIENT)
Dept: MRI IMAGING | Facility: CLINIC | Age: 42
End: 2022-12-08

## 2022-12-22 ENCOUNTER — APPOINTMENT (OUTPATIENT)
Dept: MRI IMAGING | Facility: CLINIC | Age: 42
End: 2022-12-22

## 2022-12-22 ENCOUNTER — OUTPATIENT (OUTPATIENT)
Dept: OUTPATIENT SERVICES | Facility: HOSPITAL | Age: 42
LOS: 1 days | End: 2022-12-22
Payer: COMMERCIAL

## 2022-12-22 DIAGNOSIS — M25.859 OTHER SPECIFIED JOINT DISORDERS, UNSPECIFIED HIP: ICD-10-CM

## 2022-12-22 DIAGNOSIS — K08.409 PARTIAL LOSS OF TEETH, UNSPECIFIED CAUSE, UNSPECIFIED CLASS: Chronic | ICD-10-CM

## 2022-12-22 DIAGNOSIS — Z98.890 OTHER SPECIFIED POSTPROCEDURAL STATES: Chronic | ICD-10-CM

## 2022-12-22 DIAGNOSIS — Z00.8 ENCOUNTER FOR OTHER GENERAL EXAMINATION: ICD-10-CM

## 2022-12-22 PROCEDURE — 73721 MRI JNT OF LWR EXTRE W/O DYE: CPT | Mod: 26,RT

## 2022-12-22 PROCEDURE — 73721 MRI JNT OF LWR EXTRE W/O DYE: CPT

## 2022-12-30 ENCOUNTER — APPOINTMENT (OUTPATIENT)
Dept: ORTHOPEDIC SURGERY | Facility: CLINIC | Age: 42
End: 2022-12-30
Payer: COMMERCIAL

## 2022-12-30 VITALS — HEIGHT: 65 IN | WEIGHT: 120 LBS | BODY MASS INDEX: 19.99 KG/M2

## 2022-12-30 DIAGNOSIS — M70.71 OTHER BURSITIS OF HIP, RIGHT HIP: ICD-10-CM

## 2022-12-30 DIAGNOSIS — M76.01 GLUTEAL TENDINITIS, RIGHT HIP: ICD-10-CM

## 2022-12-30 PROCEDURE — 99214 OFFICE O/P EST MOD 30 MIN: CPT

## 2023-01-05 NOTE — ED PROVIDER NOTE - CONSTITUTIONAL, MLM
Patient Education     Acute Bronchitis  Your healthcare provider has told you that you have acute bronchitis. Bronchitis is infection or inflammation of the airways in the lungs (bronchial tubes). Normally, air moves easily in and out of the airways. Bronchitis narrows the airways. This makes it harder for air to flow in and out of the lungs. This causes symptoms such as shortness of breath, coughing up yellow or green mucus, and wheezing.  Bronchitis can be acute or chronic. Acute means it happens quickly and goes away in a short time. Chronic means a condition lasts a long time and often comes back. Most people with acute bronchitis get better in 1 to 2 weeks.     What causes acute bronchitis?  Acute bronchitis is often caused by a virus such as a cold or the flu. In some cases, it may be caused by bacteria. Certain factors make it more likely for a cold or flu to turn into bronchitis. These include being very young, being elderly, having a heart or lung problem, or having a weak immune system. Cigarette smoking also makes bronchitis more likely.  When bronchitis develops, the airways become swollen. The airways may also become infected with bacteria. This is known as a secondary infection.  Symptoms of acute bronchitis  Symptoms can include:  Coughing with mucus  Wheezing  Feeling short of breath  Chest pain  Fever  Diagnosing acute bronchitis  Your healthcare provider will ask about your symptoms and health history. He or she will give you a physical exam. This will include listening to your lungs while you breathe. You may have a chest X-ray to look for infection in the lungs (pneumonia) if you have had a fever. You may also have blood tests to check for infection.  Treating acute bronchitis  Bronchitis usually goes away in 1 to 2 weeks without treatment. You can help feel better by:  Taking medicine as directed. Talk to your healthcare provider before taking any over-the-counter medicines (OTC). Some OTC  medicines help relieve inflammation in your bronchial tubes. They can also thin mucus. This makes it easier to cough up. Your healthcare provider may prescribe an inhaler to help open up the bronchial tubes. Most of the time, acute bronchitis is caused by a viral infection. Antibiotics are usually not prescribed for viral infections.  Drinking plenty of fluids, such as water, juice, or warm soup. Fluids loosen mucus so that you can cough it up. This helps you breathe more easily. Fluids also prevent dehydration.  Using a humidifier. This can help reduce coughing.  Getting plenty of rest  Not smoking. Also, don't let anyone else smoke in your home. In public places, move away from secondhand smoke.  Recovery and follow-up  Follow up with your doctor. You will likely feel better in 1 to 2 weeks. But you may have a dry cough for a longer time. Let your doctor know if you still have symptoms other than a dry cough after 2 weeks. Tell him or her if you get bronchial infections often.  Self-care tips  To get relief from your symptoms and prevent bronchitis:  Stop smoking. Stopping smoking is the most important step you can take to treat bronchitis. If you need help stopping smoking, talk with your healthcare provider.  Stay away from secondhand smoke and other irritants. Try to stay away from smoke, chemicals, fumes, and dust. Don’t let anyone smoke in your home. Stay indoors on smoggy days.  Prevent lung infections. Ask your healthcare provider about the flu and pneumonia vaccines. Take steps to prevent colds and other lung infections.  Wash your hands well. Wash your hands often with soap and water. Use hand  when you can’t wash your hands. Stay away from crowds during cold and flu season.  When to call your healthcare provider  Call the healthcare provider if you have any of these:  Fever of 100.4°F ( 38.0°C) or higher, or as directed by your healthcare provider  Symptoms that get worse, or new  symptoms  Breathing not getting better with treatments  Symptoms that don’t start to get better in 1 week  Radha last reviewed this educational content on 8/1/2019 © 2000-2020 [employer] [address] [place] [address], [place] [address]. All rights reserved. This information is not intended as a substitute for professional medical care. Always follow your healthcare professional's instructions.            normal... Well appearing, awake, alert, oriented to person, place, time/situation and in moderate  distress.

## 2023-01-09 ENCOUNTER — TRANSCRIPTION ENCOUNTER (OUTPATIENT)
Age: 43
End: 2023-01-09

## 2023-01-10 ENCOUNTER — TRANSCRIPTION ENCOUNTER (OUTPATIENT)
Age: 43
End: 2023-01-10

## 2023-01-11 ENCOUNTER — APPOINTMENT (OUTPATIENT)
Dept: PAIN MANAGEMENT | Facility: CLINIC | Age: 43
End: 2023-01-11
Payer: COMMERCIAL

## 2023-01-11 VITALS
SYSTOLIC BLOOD PRESSURE: 110 MMHG | BODY MASS INDEX: 19.66 KG/M2 | HEIGHT: 65 IN | DIASTOLIC BLOOD PRESSURE: 70 MMHG | HEART RATE: 63 BPM | WEIGHT: 118 LBS

## 2023-01-11 PROCEDURE — 99214 OFFICE O/P EST MOD 30 MIN: CPT

## 2023-01-11 NOTE — ASSESSMENT
[FreeTextEntry1] : Will trial Nurtec QOD\par continue Ubrelvy orn \par MRA, MRI head . \par \par \par

## 2023-01-11 NOTE — HISTORY OF PRESENT ILLNESS
[Headache] : headache [Photophobia] : photophobia [Phonophobia] : phonophobia [Neck Pain] : neck pain [> 4 hours] : > 4 hours [FreeTextEntry1] : Pt returns today after flare up of migraine  / headache. Pt has hx of biltaeral internal carotid artery disection in 2017. . \par Pt notes on 12/30/22 had a significant migraine with  " deep visceral pain " . Pt  rested ,hydrated . The next day took Nurtec and felt slightly better . Pain would continue and worsen at times . Eventually took Ubrelvy and then began to feel better ,required at least 2 doses of Ubrelvy . Pt felt like she had  "white vision."Was not able to do much for almost 4-5 days. \par TOday she does continue to have left sided pain but it is manageable 4/10. \par Otherwise feeling healthy.

## 2023-01-11 NOTE — PHYSICAL EXAM
[General Appearance - Alert] : alert [General Appearance - Well Nourished] : well nourished [General Appearance - Well Developed] : well developed [General Appearance - Well-Appearing] : healthy appearing [Oriented To Time, Place, And Person] : oriented to person, place, and time [Affect] : the affect was normal [Mood] : the mood was normal [Cranial Nerves Facial (VII)] : face symmetrical [Cranial Nerves Accessory (XI - Cranial And Spinal)] : head turning and shoulder shrug symmetric [Cranial Nerves Hypoglossal (XII)] : there was no tongue deviation with protrusion [Motor Strength] : muscle strength was normal in all four extremities [2+] : Patella left 2+ [Sclera] : the sclera and conjunctiva were normal [PERRL With Normal Accommodation] : pupils were equal in size, round, reactive to light, with normal accommodation [Extraocular Movements] : extraocular movements were intact [] : no respiratory distress [Abnormal Walk] : normal gait [Paresis Pronator Drift Right-Sided] : no pronator drift on the right [Paresis Pronator Drift Left-Sided] : no pronator drift on the left [Motor Strength Upper Extremities Bilaterally] : strength was normal in both upper extremities [Motor Strength Lower Extremities Bilaterally] : strength was normal in both lower extremities [Coordination - Dysmetria Impaired Finger-to-Nose Bilateral] : not present

## 2023-01-13 ENCOUNTER — TRANSCRIPTION ENCOUNTER (OUTPATIENT)
Age: 43
End: 2023-01-13

## 2023-01-13 RX ORDER — UBROGEPANT 100 MG/1
100 TABLET ORAL
Qty: 1 | Refills: 3 | Status: ACTIVE | COMMUNITY
Start: 2022-08-15 | End: 1900-01-01

## 2023-01-20 ENCOUNTER — TRANSCRIPTION ENCOUNTER (OUTPATIENT)
Age: 43
End: 2023-01-20

## 2023-02-06 ENCOUNTER — TRANSCRIPTION ENCOUNTER (OUTPATIENT)
Age: 43
End: 2023-02-06

## 2023-02-08 ENCOUNTER — APPOINTMENT (OUTPATIENT)
Dept: ORTHOPEDIC SURGERY | Facility: CLINIC | Age: 43
End: 2023-02-08
Payer: COMMERCIAL

## 2023-02-08 PROCEDURE — 99213 OFFICE O/P EST LOW 20 MIN: CPT

## 2023-02-08 NOTE — PHYSICAL EXAM
Pt AO. On 0.5 L/min of Supplemental O2. Contact precautions maintained. Prn tylenol given for c/o headache. Call light in reach and bed in lowest position. Per plan of care to be discharged today to [facility] with [facility]. [de-identified] : General Exam\par \par Well developed, well nourished\par No apparent distress\par Oriented to person, place, and time\par Mood: Normal\par Affect: Normal\par Balance and coordination: Normal\par Gait: Normal\par \par Right hip exam\par \par ·	Skin: Clean/dry and intact\par ·	Inspection: No obvious deformity, no swelling, no ecchymosis.\par ·	Tenderness: No tenderness over greater trochanter/glut medius insertion. No tenderness pubic symphysis, pubic tubercle, hip flexors. No ttp ischial tuberosity or buttock. No ttp over the ASIS/Illiac crest.\par ·	ROM: 0-120 deg.  clicking with dotation\par ·	Additional tests: Negative impingement test, no pain with circumduction\par ·	Strength: 5/5 hip flexion/ADD/ABD/Q/H/TA/GS/EHL\par ·	Neuro: Sensation in tact to light touch throughout in dp/sp/tib/froylan/saph distributions\par ·	Pulses: 2+ DP/PT pulses\par \par  [de-identified] : Radiographs and MRI obtained previously were reviewed.  There is mild dysplasia with a center edge angle of 21 degrees.  There is tearing of the anterior superior acetabular labrum.

## 2023-02-08 NOTE — HISTORY OF PRESENT ILLNESS
[de-identified] : 42 year old female comes to the office presenting with right hip pain for several years - since giving birth to her last child 9 years ago. Pain is located in the groin at times but states her glute and hamstring pain is significantly worse. Pain sometimes radiates to the thigh as well - she denies ever having lower back issues. Patient admits to clicking, popping, and soreness of the right hip. Pain is made worse with long walks and the pain wakes her up in the middle of the night. Patient feels like her hip gets stuck and she has to adjust her leg. She admits to weakness of the leg as well. She has recently started PT which has given minimal relief. The patient's past medical history, past surgical history, medications, allergies, and social history were reviewed by me today with the patient and documented accordingly. In addition, the patient's family history, which is noncontributory to this visit, was also reviewed.

## 2023-02-08 NOTE — DISCUSSION/SUMMARY
[de-identified] : 42-year-old female right hip labral tear and mechanical symptoms.  She has a history of documented Winsome-Danlos syndrome.  Concern for hip instability.  We will arrange for diagnostic ultrasound assessment of instability.  She does have evidence of dysplasia.  Would consider consultation for possible osteotomy based on ultrasound findings.  All questions were answered she will follow-up after ultrasound

## 2023-02-16 ENCOUNTER — APPOINTMENT (OUTPATIENT)
Dept: RADIOLOGY | Facility: CLINIC | Age: 43
End: 2023-02-16
Payer: COMMERCIAL

## 2023-02-16 ENCOUNTER — OUTPATIENT (OUTPATIENT)
Dept: OUTPATIENT SERVICES | Facility: HOSPITAL | Age: 43
LOS: 1 days | End: 2023-02-16
Payer: COMMERCIAL

## 2023-02-16 ENCOUNTER — RESULT REVIEW (OUTPATIENT)
Age: 43
End: 2023-02-16

## 2023-02-16 ENCOUNTER — APPOINTMENT (OUTPATIENT)
Dept: ULTRASOUND IMAGING | Facility: CLINIC | Age: 43
End: 2023-02-16
Payer: COMMERCIAL

## 2023-02-16 ENCOUNTER — NON-APPOINTMENT (OUTPATIENT)
Age: 43
End: 2023-02-16

## 2023-02-16 DIAGNOSIS — Z98.890 OTHER SPECIFIED POSTPROCEDURAL STATES: Chronic | ICD-10-CM

## 2023-02-16 DIAGNOSIS — S73.191A OTHER SPRAIN OF RIGHT HIP, INITIAL ENCOUNTER: ICD-10-CM

## 2023-02-16 DIAGNOSIS — M25.851 OTHER SPECIFIED JOINT DISORDERS, RIGHT HIP: ICD-10-CM

## 2023-02-16 DIAGNOSIS — K08.409 PARTIAL LOSS OF TEETH, UNSPECIFIED CAUSE, UNSPECIFIED CLASS: Chronic | ICD-10-CM

## 2023-02-16 PROCEDURE — 73502 X-RAY EXAM HIP UNI 2-3 VIEWS: CPT | Mod: 26,RT

## 2023-02-16 PROCEDURE — 76881 US COMPL JOINT R-T W/IMG: CPT | Mod: 26,RT

## 2023-02-16 PROCEDURE — 76881 US COMPL JOINT R-T W/IMG: CPT

## 2023-02-16 PROCEDURE — 73502 X-RAY EXAM HIP UNI 2-3 VIEWS: CPT

## 2023-03-01 ENCOUNTER — NON-APPOINTMENT (OUTPATIENT)
Age: 43
End: 2023-03-01

## 2023-03-01 ENCOUNTER — APPOINTMENT (OUTPATIENT)
Dept: OPHTHALMOLOGY | Facility: CLINIC | Age: 43
End: 2023-03-01
Payer: COMMERCIAL

## 2023-03-01 PROCEDURE — 92004 COMPRE OPH EXAM NEW PT 1/>: CPT

## 2023-03-07 ENCOUNTER — APPOINTMENT (OUTPATIENT)
Dept: ORTHOPEDIC SURGERY | Facility: CLINIC | Age: 43
End: 2023-03-07
Payer: COMMERCIAL

## 2023-03-07 PROCEDURE — 99214 OFFICE O/P EST MOD 30 MIN: CPT

## 2023-03-07 NOTE — DISCUSSION/SUMMARY
[de-identified] : 42-year-old female with history of Winsome-Danlos, hip dysplasia, hip labral tear.  We discussed treatment options at length.  We discussed nonoperative care with activity modification physical therapy.  We discussed surgical treatment.  We discussed both hip arthroscopy to address her labral tear as well as periacetabular osteotomy given her underlying dysplasia.  I am going to refer her to Dr. DELGADO tomorrow for consultation regarding this.  She does have a center edge angle of approximately 21 degrees however she is deficient anterolateral coverage.  She is little bit older to consider an osteotomy although she has minimal arthritic changes.  We did discuss the possibility of hip arthroscopy.  She will follow-up after her consultation with DANNY Espinoza for further discussion of treatment options

## 2023-03-07 NOTE — HISTORY OF PRESENT ILLNESS
[de-identified] : 42 year old female comes to the office presenting with right hip pain for several years - since giving birth to her last child 9 years ago. Pain is located in the groin at times but states her glute and hamstring pain is significantly worse. Pain sometimes radiates to the thigh as well - she denies ever having lower back issues. Patient admits to clicking, popping, and soreness of the right hip. Pain is made worse with long walks and the pain wakes her up in the middle of the night. Patient feels like her hip gets stuck and she has to adjust her leg. She admits to weakness of the leg as well. She has recently started PT which has given minimal relief. \par \par Since her last visit she reports continued pain and popping sensations in her hip she is decreased her exercises because of pain she is becoming very frustrated

## 2023-03-07 NOTE — PHYSICAL EXAM
[de-identified] : Right hip exam\par \par ·	Skin: Clean/dry and intact\par ·	Inspection: No obvious deformity, no swelling, no ecchymosis.\par ·	Tenderness: No tenderness over greater trochanter/glut medius insertion. No tenderness pubic symphysis, pubic tubercle, hip flexors. No ttp ischial tuberosity or buttock. No ttp over the ASIS/Illiac crest.\par ·	ROM: 0-120 deg.  clicking with dotation\par ·	Additional tests: Negative impingement test, no pain with circumduction\par ·	Strength: 5/5 hip flexion/ADD/ABD/Q/H/TA/GS/EHL\par ·	Neuro: Sensation in tact to light touch throughout in dp/sp/tib/froylan/saph distributions\par ·	Pulses: 2+ DP/PT pulses\par \par  [de-identified] : MRI right hip again reviewed.  There is a nondisplaced anterior superior labral tear.  There is trace iliopsoas bursitis.\par \par Ultrasound reviewed.  No evidence of hip subluxation\par \par Radiographs again reviewed hip dysplasia center edge angle 21 degrees

## 2023-03-08 RX ORDER — RIMEGEPANT SULFATE 75 MG/75MG
75 TABLET, ORALLY DISINTEGRATING ORAL
Qty: 16 | Refills: 5 | Status: COMPLETED | COMMUNITY
Start: 2022-08-04 | End: 2023-03-08

## 2023-03-14 RX ORDER — GALCANEZUMAB 120 MG/ML
120 INJECTION, SOLUTION SUBCUTANEOUS
Qty: 2 | Refills: 0 | Status: ACTIVE | COMMUNITY
Start: 2023-03-08 | End: 1900-01-01

## 2023-03-15 ENCOUNTER — APPOINTMENT (OUTPATIENT)
Dept: PAIN MANAGEMENT | Facility: CLINIC | Age: 43
End: 2023-03-15
Payer: COMMERCIAL

## 2023-03-15 VITALS
BODY MASS INDEX: 19.66 KG/M2 | WEIGHT: 118 LBS | HEIGHT: 65 IN | HEART RATE: 62 BPM | DIASTOLIC BLOOD PRESSURE: 68 MMHG | SYSTOLIC BLOOD PRESSURE: 104 MMHG

## 2023-03-15 DIAGNOSIS — I77.3 ARTERIAL FIBROMUSCULAR DYSPLASIA: ICD-10-CM

## 2023-03-15 PROCEDURE — 99214 OFFICE O/P EST MOD 30 MIN: CPT

## 2023-03-16 ENCOUNTER — APPOINTMENT (OUTPATIENT)
Dept: PEDIATRIC ORTHOPEDIC SURGERY | Facility: CLINIC | Age: 43
End: 2023-03-16
Payer: COMMERCIAL

## 2023-03-16 DIAGNOSIS — S73.191A OTHER SPRAIN OF RIGHT HIP, INITIAL ENCOUNTER: ICD-10-CM

## 2023-03-16 DIAGNOSIS — Q65.89 OTHER SPECIFIED CONGENITAL DEFORMITIES OF HIP: ICD-10-CM

## 2023-03-16 PROCEDURE — 99204 OFFICE O/P NEW MOD 45 MIN: CPT

## 2023-03-17 ENCOUNTER — TRANSCRIPTION ENCOUNTER (OUTPATIENT)
Age: 43
End: 2023-03-17

## 2023-03-20 ENCOUNTER — TRANSCRIPTION ENCOUNTER (OUTPATIENT)
Age: 43
End: 2023-03-20

## 2023-03-20 NOTE — ASSESSMENT
[FreeTextEntry1] : REASON FOR REQUEST: Patient comes today with a diagnosis of Winsome-Danlos syndrome, chronic complaints of right hip pain with mild acetabular dysplasia and labral tearing.  Today's visit lasted for approximately 50 minutes with time spent discussing periacetabular osteotomy as a treatment modality for this complex issue.\par  \par HISTORY OF THE PRESENT ILLNESS: Jeannie is a 42-year-old female who has the complex medical history.  The patient more or less has been very active for her entire life.  She has been an athlete in the past and was recently evaluated by Dr. 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  She has been evaluated by Dr. Andres Contreras after she had reported complaints of hip pain localized to the groin with diminished walking stamina which has truly affected her quality of life and has made it difficult for her to remain active.  X-ray imaging, MRI scan do reveal evidence of significant anterior superior labral tearing.  The patient has what appears to be mild acetabular dysplasia, borderline normal with lateral center-edge angle of 21 degrees.  In addition, the patient also underwent an ultrasound scan of her hip to evaluate for macroinstability which was unremarkable with no evidence of subluxation of her hip given the underlying diagnosis of Winsome-Danlos.  Patient has tried physical therapy services without reported improvement.  Dr. Contreras refers her for possible discussion of periacetabular osteotomy given the connective tissue disorder as well as the fact that the patient does not have any evidence of degenerative changes of the hip.  He is making considerations for arthroscopic fixation of the labrum to help her recover. \par  \par PAST MEDICAL HISTORY:  Significant for vascular Winsome-Danlos syndrome.  \par  \par ALLERGIES: She has no known drug allergies \par  \par MEDICATIONS: The patient currently is taking carvedilol 2.5 mg, baby aspirin once a day, mesalamine.\par  \par REVIEW OF SYSTEMS: Today is negative for fever, chills, chest pain, shortness of breath, or rashes.  \par  \par FAMILY/SOCIAL HISTORY:  Noncontributory.  There are no family members who were reported to have connective tissue disorder nor developmental dysplasia of the hips.  Patient works in the medical field.\par  \par PHYSICAL EXAMINATION: On examination today, Jeannie is in no apparent distress.  She ambulates with mild evidence of a limp but this appears to be for the most part nonantalgic with no evidence of Trendelenburg component to her gait.  Supine examination reveals normal clinical alignment to the leg with no leg-length inequality.  She has guarding with attempts at hip flexion greater than 90 degrees but does not have discrete pain.  She has mildly positive anterior impingement sign on bicycle tested in the lateral decubitus position.  The patient does have positive anterior impingement sign although I did not force the exam as the patient has had reported complaints of pain following the examination particularly with anterior impingement and the patient has no palpable crepitus, clunks, or clicks with hip range of motion but does have what appears to be a palpable snap which is consistent with coxa saltans interna.  She has no locking of her hip and no visible evidence of quadriceps or calf atrophy.\par  \par REVIEW OF IMAGING: Multiple imaging studies were available for review including ultrasound of the hip which reveals no evidence of macroinstability of the hip and no subluxation.  MRI scan was available for review indicating anterior superior labral tearing which appears to be extensive and the patient has no degenerative changes of the joint with what appears to be well-preserved cartilage in the weightbearing surface of the acetabulum and no changes of the femoral head. \par  \par X-ray images were also available for review.  These appear to be more of inlet views but do demonstrate a lateral center-edge angle which is now approximately 21 degrees on the right hip.  \par  \par ASSESSMENT/PLAN: Jeannie is a 42-year-old  female who has the diagnosis of what appears to be acetabular dysplasia mild and borderline with anterior and superior labral tearing of the right hip and chronic hip complaints with vascular type Winsome-Danlos syndrome and ligamentous laxity.  Today, I reviewed with Jeannie at length possible treatment modalities which can involve nonoperative management with likely need at some point for total hip arthroplasty down the road.  I reviewed the fact that Dr. oCntreras could indeed perform a less invasive surgery including arthroscopic fixation of her labral tear and the recovery with much quicker.  I did review the fact that this would be less of physiologic stress on the body but did review the fact with the underlying diagnosis of Winsome-Danlos, there could be potential failure and creation of micro into macroinstability.  Dr. Contreras certainly would have to close the capsule following the other procedure in order to try to prevent macroinstabililty from developing.  I reviewed possibility of periacetabular osteotomy to further provide bony support to the hip which would aide in the labral healing but likely would have to be performed with an arthroscopic procedure as well.  I did review the procedure which would involve an extended recovery period and has significant intraoperative morbidity and risks.  Patient's recommendations from her Hematology Service would indicate the fact that she should avoid any major surgical procedures and the patient is currently being managed on anticoagulation treatment and currently is taking aspirin.  As such, after having a full discussion of risks, benefits, and alternatives Jeannie would like to consider all options.  She will be in contact with either myself or Dr. Contreras to discuss how she would like to proceed moving forward.  All questions were answered to satisfaction today.  Jeannie expressed understanding and agrees. \par

## 2023-03-23 ENCOUNTER — NON-APPOINTMENT (OUTPATIENT)
Age: 43
End: 2023-03-23

## 2023-03-26 PROBLEM — I77.3 FIBROMUSCULAR DYSPLASIA: Status: ACTIVE | Noted: 2022-01-11

## 2023-03-26 NOTE — REVIEW OF SYSTEMS
[Fever] : no fever [Chills] : no chills [Feeling Poorly] : feeling poorly [Feeling Tired] : feeling tired [Eye Pain] : eye pain [Eyesight Problems] : no eyesight problems [Nasal Discharge] : no nasal discharge [Cough] : no cough [Arthralgias] : arthralgias [Neck Pain] : neck pain [Skin Lesions] : no skin lesions [Itching] : no itching [Convulsions] : no convulsions [Fainting] : no fainting [As Noted in HPI] : as noted in HPI [Sleep Disturbances] : sleep disturbances [Muscle Weakness] : no muscle weakness

## 2023-03-26 NOTE — PHYSICAL EXAM
[General Appearance - Alert] : alert [General Appearance - In No Acute Distress] : in no acute distress [General Appearance - Well Nourished] : well nourished [General Appearance - Well Developed] : well developed [General Appearance - Well-Appearing] : healthy appearing [Oriented To Time, Place, And Person] : oriented to person, place, and time [Impaired Insight] : insight and judgment were intact [Affect] : the affect was normal [Mood] : the mood was normal [Memory Recent] : recent memory was not impaired [Memory Remote] : remote memory was not impaired [Person] : oriented to person [Place] : oriented to place [Time] : oriented to time [Short Term Intact] : short term memory intact [Remote Intact] : remote memory intact [Registration Intact] : recent registration memory intact [Concentration Intact] : normal concentrating ability [Visual Intact] : visual attention was ~T not ~L decreased [Writing A Sentence] : no difficulty writing a sentence [Fluency] : fluency intact [Comprehension] : comprehension intact [Reading] : reading intact [Current Events] : adequate knowledge of current events [Past History] : adequate knowledge of personal past history [Vocabulary] : adequate range of vocabulary [Cranial Nerves Oculomotor (III)] : extraocular motion intact [Cranial Nerves Facial (VII)] : face symmetrical [Cranial Nerves Vestibulocochlear (VIII)] : hearing was intact bilaterally [Cranial Nerves Accessory (XI - Cranial And Spinal)] : head turning and shoulder shrug symmetric [Cranial Nerves Hypoglossal (XII)] : there was no tongue deviation with protrusion [No Muscle Atrophy] : normal bulk in all four extremities [Motor Strength Upper Extremities Bilaterally] : strength was normal in both upper extremities [Allodynia] : no ~T allodynia present [Sensation Tactile Decrease] : light touch was intact [Abnormal Walk] : normal gait [Tremor] : no tremor present [Dysdiadochokinesia Bilaterally] : not present [Sclera] : the sclera and conjunctiva were normal [No NATHALY] : no internuclear ophthalmoplegia [Strabismus] : no strabismus was seen [Outer Ear] : the ears and nose were normal in appearance [Hearing Threshold Finger Rub Not [place]] : hearing was normal [Neck Appearance] : the appearance of the neck was normal [Neck Cervical Mass (___cm)] : no neck mass was observed [Exaggerated Use Of Accessory Muscles For Inspiration] : no accessory muscle use [Edema] : there was no peripheral edema [Nail Clubbing] : no clubbing  or cyanosis of the fingernails [Involuntary Movements] : no involuntary movements were seen [Skin Color & Pigmentation] : normal skin color and pigmentation [] : no rash

## 2023-03-26 NOTE — ASSESSMENT
[FreeTextEntry1] : Discussed general headache hygiene\par COntinue Emgality\par prn Ubrelvy\par general headache hygiene\par referral to [facility] and [facility] sites.\par

## 2023-03-26 NOTE — HISTORY OF PRESENT ILLNESS
[Headache] : headache [Neck Pain] : neck pain [Scalp Tenderness] : scalp tenderness [FreeTextEntry1] : Pt notes history including MRA with and without contrast, CTA with and without contrast, cerebral angiogram due to her new daily persistent headache.\kash Recalls having headaches when she was at school.  Worsened in her 20s when she was having migraine prior to cycle and switched her OCP.  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  Had a headache that had progressively gotten worse over 7-10 days.  FElt poorly and used ibuprofen and Claritin d and still felt poor. \kash Had then heard pulsatile tinnitus.and felt she was overdoing the Claritin d. \kash Was exercising and had change in her balance when working out.  Then she had rapid change in her vision and felt "the world shifted and bolts were coming through." which is when she realized something bad.\kash Made appts with pmd and ent.  Over a few days had recurrent visual changes- flashes and shifting.  [place] drive to pmd was quite frightening.  Saw pmd who gave her triptan and ordered head ct and neuro consult.\kash Made appt for CT scan and to see local neurologist Dr. 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 "about to snap."\par Had felt the right also likely had dissected and healed.  Right vertebral apparently also had sign of previous injury.\par At the end saw Dr. Nguyen who had did cerebral angiogram and then discharged her.\kash Had then followed up with Dr. Langford and Umesh Arias who deal with fibromuscular dysplasia.  Also had followed with neurosurgeon at [facility].\kash Had gradual improvement in her neck pain over time but still fatigued, exhausted and lost weight.  Felt overall unwell.\kash By Oct. 2017.  Was d/c'd on Coumadin and then changed to Xarelto but discharged it in spring because of heavy menses and improvement of her pseudoaneurysm.\kash Still felt poorly and was still getting frequent headaches and having cognitive issues.  Was still hypervigilant and concerned that other things missed and that led to cerebral angiogram.  Saw small aneurysm in left ica but most other things had improved.\par Still with tortuosity of right vertebral artery.\kash Had seen neurologist at [place] who put her on propranolol which made her feel dizzy and with too low pressure (pulse in 20s).  Had been recommended amitriptyline, botox, device on head etc and "she just suffered."  Tried to maximize non pharmacologic tx.  However, was still getting recurrence.  Sometimes 10 day long headache and wasn't sure when she was then going to have to go to ED.\kash Last head CT was 2021 and she had 10 day long event that led to ED visit and was getting icepick type events.\kash That happened again in last year which is what led her to see Marielena over last summer.\kash Has also had issues with dissection of her renal arteries as well.  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  Does feel higher dose of carvedilol was helpful for headaches but "felt tranquilized."\kash Is in therapy but has avoided medications.  She does feel stressed particularly because of 2 children.  Son does have abnormality as well.  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  Had colonic irritation.\par \par

## 2023-04-04 ENCOUNTER — APPOINTMENT (OUTPATIENT)
Dept: ORTHOPEDIC SURGERY | Facility: CLINIC | Age: 43
End: 2023-04-04
Payer: COMMERCIAL

## 2023-04-04 DIAGNOSIS — M25.851 OTHER SPECIFIED JOINT DISORDERS, RIGHT HIP: ICD-10-CM

## 2023-04-04 PROCEDURE — 99214 OFFICE O/P EST MOD 30 MIN: CPT

## 2023-04-04 NOTE — DISCUSSION/SUMMARY
[de-identified] : 42-year-old female with continued right hip pain.  We again discussed treatment options at length.  We again discussed nonoperative care with activity modification physical therapy medications injections.  We again discussed surgical treatment with right hip arthroscopy labral repair capsular closure.  Risks benefits alternatives again discussed at length including but not limited to risks such as bleeding and infection nerve and vessel injury continued pain stiffness retear need for future surgery medical complications such as DVT or PE and anesthesia complications.  All questions were once again answered she will again think about her options and schedule at her convenience.

## 2023-04-04 NOTE — PHYSICAL EXAM
[de-identified] : Right hip exam\par \par Skin: Clean/dry and intact\par Inspection: No obvious deformity, no swelling, no ecchymosis.\par Tenderness:  no tenderness over greater trochanter/glut medius insertion. No tenderness pubic symphysis, pubic tubercle, hip flexors. No ttp ischial tuberosity or buttock. No ttp over the ASIS/Illiac crest.\par ROM: 0-120°. Internal rotation 30 external rotation 70\par Painful ROM: None\par Additional tests: No pain with circumduction negative impingement test at 90° mildly positive impingement test at 60° negative [facility]\par Strength: 5/5 hip flexion/ADD/ABD/Q/H/TA/GS/EHL\par Neuro: Sensation in tact to light touch throughout in dp/sp/tib/froylan/saph distributions\par Pulses: 2+ DP/PT pulses\par  [de-identified] : MRI right hip again reviewed.  Nondisplaced anterior superior labral tear.  Hamstring and gluteal tendinosis

## 2023-04-04 NOTE — HISTORY OF PRESENT ILLNESS
[de-identified] : 42 year old female comes to the office presenting with right hip pain for several years - since giving birth to her last child 9 years ago. Pain is located in the groin at times but states her glute and hamstring pain is significantly worse. Pain sometimes radiates to the thigh as well - she denies ever having lower back issues. Patient admits to clicking, popping, and soreness of the right hip. Pain is made worse with long walks and the pain wakes her up in the middle of the night. Patient feels like her hip gets stuck and she has to adjust her leg. She admits to weakness of the leg as well. She has recently started PT which has given minimal relief. \par \par Since her last visit she reports continued pain and popping sensations in her hip she is decreased her exercises because of pain she is becoming very frustrated \par \par She did have a consultation regarding possible pelvic osteotomy which we do not feel is necessary at this time.

## 2023-04-25 ENCOUNTER — APPOINTMENT (OUTPATIENT)
Dept: NEUROLOGY | Facility: CLINIC | Age: 43
End: 2023-04-25

## 2023-04-28 ENCOUNTER — TRANSCRIPTION ENCOUNTER (OUTPATIENT)
Age: 43
End: 2023-04-28

## 2023-05-05 ENCOUNTER — TRANSCRIPTION ENCOUNTER (OUTPATIENT)
Age: 43
End: 2023-05-05

## 2023-05-10 ENCOUNTER — APPOINTMENT (OUTPATIENT)
Dept: PAIN MANAGEMENT | Facility: CLINIC | Age: 43
End: 2023-05-10
Payer: COMMERCIAL

## 2023-05-10 VITALS
HEART RATE: 62 BPM | SYSTOLIC BLOOD PRESSURE: 103 MMHG | WEIGHT: 120 LBS | BODY MASS INDEX: 19.99 KG/M2 | HEIGHT: 65 IN | DIASTOLIC BLOOD PRESSURE: 67 MMHG

## 2023-05-10 DIAGNOSIS — I67.1 CEREBRAL ANEURYSM, NONRUPTURED: ICD-10-CM

## 2023-05-10 DIAGNOSIS — G43.909 MIGRAINE, UNSPECIFIED, NOT INTRACTABLE, W/OUT STATUS MIGRAINOSUS: ICD-10-CM

## 2023-05-10 PROCEDURE — 99214 OFFICE O/P EST MOD 30 MIN: CPT

## 2023-05-10 RX ORDER — RIMEGEPANT SULFATE 75 MG/75MG
TABLET, ORALLY DISINTEGRATING ORAL
Qty: 16 | Refills: 2 | Status: ACTIVE | COMMUNITY
Start: 2023-05-10 | End: 1900-01-01

## 2023-05-18 ENCOUNTER — APPOINTMENT (OUTPATIENT)
Dept: NEUROSURGERY | Facility: CLINIC | Age: 43
End: 2023-05-18
Payer: COMMERCIAL

## 2023-05-18 ENCOUNTER — NON-APPOINTMENT (OUTPATIENT)
Age: 43
End: 2023-05-18

## 2023-05-18 DIAGNOSIS — I77.71 DISSECTION OF CAROTID ARTERY: ICD-10-CM

## 2023-05-18 PROCEDURE — 99203 OFFICE O/P NEW LOW 30 MIN: CPT

## 2023-05-18 RX ORDER — DEXAMETHASONE 4 MG/1
4 TABLET ORAL
Qty: 6 | Refills: 0 | Status: DISCONTINUED | COMMUNITY
Start: 2023-05-10 | End: 2023-05-18

## 2023-05-18 RX ORDER — GALCANEZUMAB 120 MG/ML
INJECTION, SOLUTION SUBCUTANEOUS
Qty: 1 | Refills: 5 | Status: DISCONTINUED | COMMUNITY
Start: 2023-03-08 | End: 2023-05-18

## 2023-05-18 RX ORDER — METRONIDAZOLE 7.5 MG/G
0.75 CREAM TOPICAL DAILY
Qty: 1 | Refills: 2 | Status: DISCONTINUED | COMMUNITY
Start: 2022-08-15 | End: 2023-05-18

## 2023-05-18 RX ORDER — CLINDAMYCIN PHOSPHATE 10 MG/ML
1 SOLUTION TOPICAL
Qty: 60 | Refills: 3 | Status: DISCONTINUED | COMMUNITY
Start: 2022-11-14 | End: 2023-05-18

## 2023-05-18 RX ORDER — TRETINOIN 0.5 MG/G
0.05 CREAM TOPICAL
Qty: 1 | Refills: 2 | Status: DISCONTINUED | COMMUNITY
Start: 2022-08-15 | End: 2023-05-18

## 2023-05-18 RX ORDER — MOMETASONE FUROATE 1 MG/G
0.1 CREAM TOPICAL
Qty: 45 | Refills: 1 | Status: DISCONTINUED | COMMUNITY
Start: 2022-11-14 | End: 2023-05-18

## 2023-05-22 ENCOUNTER — TRANSCRIPTION ENCOUNTER (OUTPATIENT)
Age: 43
End: 2023-05-22

## 2023-05-22 ENCOUNTER — APPOINTMENT (OUTPATIENT)
Dept: PAIN MANAGEMENT | Facility: CLINIC | Age: 43
End: 2023-05-22

## 2023-05-22 PROBLEM — G43.909 MIGRAINE HEADACHE: Status: ACTIVE | Noted: 2017-08-29

## 2023-05-22 PROBLEM — I67.1 ANEURYSM, CEREBRAL: Status: ACTIVE | Noted: 2023-05-18

## 2023-05-22 NOTE — ASSESSMENT
[FreeTextEntry1] : Would encourage additional month on Emgality.\par May consider trial of Vyepti vs Qulipta

## 2023-05-22 NOTE — HISTORY OF PRESENT ILLNESS
[FreeTextEntry1] : Pt reviews her discontinuation of Nurtec and 2 months of Emgality.\par Has had recurrence of her migraine over her last week with lack of response to Ubrelvy (high recurrence.)\par Did see some better acute response initially but high rate of recurrence.\par Does note 2 months of menstrual change additionally... unclear association with Emgality. [Chronic Headache] : chronic headache [Nausea] : nausea [Photophobia] : photophobia [Phonophobia] : phonophobia [Neck Pain] : neck pain [> 15 days per month] : > 15 days per month [Worsened] : The patient reports ~his/her~ symptoms since the last visit have worsened

## 2023-05-22 NOTE — PHYSICAL EXAM
[General Appearance - Alert] : alert [General Appearance - In No Acute Distress] : in no acute distress [General Appearance - Well Nourished] : well nourished [General Appearance - Well Developed] : well developed [General Appearance - Well-Appearing] : healthy appearing [Oriented To Time, Place, And Person] : oriented to person, place, and time [Impaired Insight] : insight and judgment were intact [Affect] : the affect was normal [Mood] : the mood was normal [Memory Recent] : recent memory was not impaired [Memory Remote] : remote memory was not impaired [Person] : oriented to person [Place] : oriented to place [Time] : oriented to time [Short Term Intact] : short term memory intact [Remote Intact] : remote memory intact [Registration Intact] : recent registration memory intact [Concentration Intact] : normal concentrating ability [Visual Intact] : visual attention was ~T not ~L decreased [Writing A Sentence] : no difficulty writing a sentence [Fluency] : fluency intact [Comprehension] : comprehension intact [Reading] : reading intact [Current Events] : adequate knowledge of current events [Past History] : adequate knowledge of personal past history [Vocabulary] : adequate range of vocabulary [Cranial Nerves Oculomotor (III)] : extraocular motion intact [Cranial Nerves Facial (VII)] : face symmetrical [Cranial Nerves Accessory (XI - Cranial And Spinal)] : head turning and shoulder shrug symmetric [Cranial Nerves Vestibulocochlear (VIII)] : hearing was intact bilaterally [Cranial Nerves Hypoglossal (XII)] : there was no tongue deviation with protrusion [No Muscle Atrophy] : normal bulk in all four extremities [Sensation Tactile Decrease] : light touch was intact [Abnormal Walk] : normal gait [Sclera] : the sclera and conjunctiva were normal [No NATHALY] : no internuclear ophthalmoplegia [Strabismus] : no strabismus was seen [Outer Ear] : the ears and nose were normal in appearance [Hearing Threshold Finger Rub Not [place]] : hearing was normal [Neck Appearance] : the appearance of the neck was normal [Neck Cervical Mass (___cm)] : no neck mass was observed [Exaggerated Use Of Accessory Muscles For Inspiration] : no accessory muscle use [Edema] : there was no peripheral edema [Nail Clubbing] : no clubbing  or cyanosis of the fingernails [Involuntary Movements] : no involuntary movements were seen [Skin Color & Pigmentation] : normal skin color and pigmentation [] : no rash [Motor Strength Upper Extremities Bilaterally] : strength was normal in both upper extremities [Allodynia] : no ~T allodynia present [Tremor] : no tremor present [Dysdiadochokinesia Bilaterally] : not present

## 2023-05-22 NOTE — REVIEW OF SYSTEMS
[Fever] : no fever [Chills] : no chills [Feeling Poorly] : feeling poorly [Eye Pain] : eye pain [Eyesight Problems] : no eyesight problems [Nasal Discharge] : no nasal discharge [Chest Pain] : no chest pain [Palpitations] : no palpitations [Cough] : no cough [Arthralgias] : arthralgias [Neck Pain] : neck pain [Skin Lesions] : no skin lesions [Itching] : no itching [Fainting] : no fainting [Sleep Disturbances] : sleep disturbances [Muscle Weakness] : no muscle weakness

## 2023-05-23 ENCOUNTER — TRANSCRIPTION ENCOUNTER (OUTPATIENT)
Age: 43
End: 2023-05-23

## 2023-06-05 NOTE — CONSULT LETTER
[Dear  ___] : Dear  [unfilled], [Consult Letter:] : I had the pleasure of evaluating your patient, [unfilled]. [Please see my note below.] : Please see my note below. [Consult Closing:] : Thank you very much for allowing me to participate in the care of this patient.  If you have any questions, please do not hesitate to contact me. [Sincerely,] : Sincerely, [FreeTextEntry3] : \par \par \par \par Dr. Katarzyna Petersen

## 2023-06-05 NOTE — HISTORY OF PRESENT ILLNESS
[de-identified] : Ms. Vickers is a pleasant 41yo female who presents today with a chief complaint of left carotid dissections and pseudoaneurysm.  She has had chornic headaches for most of her life.  They worsened in 2017 and after a run she had severe left pulsatile tinnitus and visual disturbances.  She started going to [facility] and eventually got imaging at [facility] and an ambulance was called for left carotid dissection.  She was on xarelto for 6 months and discontinued it because she was told that the dissections was healing, she was having very heavy menses.  She was originally diagnosed with FMD.   In 2021, she dissected bilateral renal arteries.  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  \par \par She continues to take ASA 81mg daily.

## 2023-06-05 NOTE — ASSESSMENT
[FreeTextEntry1] : Impression:\par Vascular Winsome Kelly\par Hx of Left Carotid Dissection and Pseudoaneurysm\par Left Cavernous ICA aneurysm\par \par CTA 2017 reveals focal dissection with pseudoaneurysm of the high cervical left internal carotid artery with a maximum diameter of 8mm; chronic appearing dissection of the right internal carotid artery without saccular pseudoaneurysm; questionable tiny left cavernous ICA and questionable anterior communicating artery aneurysm\par MRA Head 1/2023 reveals left high cervical ICA pseudoaneurysm (not completely assessed on this scan); possible broad based 2.8mm aneurysm of the left proximal ICA; possible small aneurysm of the Acomm/left A1 A2 junction without significant change from 2017\par \par I discussed with the patient the natural history of brain aneurysms, the various treatment options (endovascular, open surgical) and the pros and cons of treatment versus observation.  I reassured her that she there is no indication for endovascular treatment at this time.  The cavernous ICA aneurysm is low risk and the possible Acomm aneurysm if very small and I recommend observation for both.  She should also continue to monitor her chronic ICA dissections with serial imaging.  \par \par Plan:\par Continue ASA\par MRA Head wo NOVA Protocol in 10/2023\par MRA Neck w/wo NOVA Protocol in 10/2023\par Follow Up with Me After Imaging\par \par **She would like for her imaging to be ordered by her vascular MD who orders imaging for her chest, abd etc. as well.  She will call us after her next Head and Neck Imaging for follow up**

## 2023-06-26 ENCOUNTER — APPOINTMENT (OUTPATIENT)
Dept: PAIN MANAGEMENT | Facility: CLINIC | Age: 43
End: 2023-06-26

## 2023-08-09 NOTE — DISCHARGE NOTE PROVIDER - PROVIDER TOKENS
FYI   PROVIDER:[TOKEN:[[id number]],FOLLOWUP:[1 week],ESTABLISHEDPATIENT:[T]],FREE:[LAST:[Dilenna],FIRST:[ (outpatient gynceologist)],PHONE:[(   )    -],FAX:[(   )    -],FOLLOWUP:[1 week],ESTABLISHEDPATIENT:[T]],PROVIDER:[TOKEN:[[id number]],FOLLOWUP:[1 week],ESTABLISHEDPATIENT:[T]],PROVIDER:[TOKEN:[[id number]]] PROVIDER:[TOKEN:[[id number]],FOLLOWUP:[1 week],ESTABLISHEDPATIENT:[T]],PROVIDER:[TOKEN:[[id number]],FOLLOWUP:[1 week],ESTABLISHEDPATIENT:[T]],PROVIDER:[TOKEN:[[id number]],FOLLOWUP:[2 weeks]],FREE:[LAST:[Dilenna],FIRST:[ (outpatient gynceologist)],PHONE:[(   )    -],FAX:[(   )    -],FOLLOWUP:[1 week],ESTABLISHEDPATIENT:[T]]

## 2023-09-18 ENCOUNTER — APPOINTMENT (OUTPATIENT)
Dept: ULTRASOUND IMAGING | Facility: CLINIC | Age: 43
End: 2023-09-18
Payer: COMMERCIAL

## 2023-09-18 ENCOUNTER — OUTPATIENT (OUTPATIENT)
Dept: OUTPATIENT SERVICES | Facility: HOSPITAL | Age: 43
LOS: 1 days | End: 2023-09-18
Payer: COMMERCIAL

## 2023-09-18 ENCOUNTER — APPOINTMENT (OUTPATIENT)
Dept: MAMMOGRAPHY | Facility: CLINIC | Age: 43
End: 2023-09-18
Payer: COMMERCIAL

## 2023-09-18 DIAGNOSIS — K08.409 PARTIAL LOSS OF TEETH, UNSPECIFIED CAUSE, UNSPECIFIED CLASS: Chronic | ICD-10-CM

## 2023-09-18 DIAGNOSIS — Z98.890 OTHER SPECIFIED POSTPROCEDURAL STATES: Chronic | ICD-10-CM

## 2023-09-18 DIAGNOSIS — Z12.31 ENCOUNTER FOR SCREENING MAMMOGRAM FOR MALIGNANT NEOPLASM OF BREAST: ICD-10-CM

## 2023-09-18 PROCEDURE — 77063 BREAST TOMOSYNTHESIS BI: CPT

## 2023-09-18 PROCEDURE — 76641 ULTRASOUND BREAST COMPLETE: CPT | Mod: 26,50

## 2023-09-18 PROCEDURE — 77067 SCR MAMMO BI INCL CAD: CPT

## 2023-09-18 PROCEDURE — 77067 SCR MAMMO BI INCL CAD: CPT | Mod: 26

## 2023-09-18 PROCEDURE — 77063 BREAST TOMOSYNTHESIS BI: CPT | Mod: 26

## 2023-09-18 PROCEDURE — 76641 ULTRASOUND BREAST COMPLETE: CPT

## 2023-10-26 ENCOUNTER — EMERGENCY (EMERGENCY)
Facility: HOSPITAL | Age: 43
LOS: 1 days | Discharge: HOME | End: 2023-10-26
Attending: EMERGENCY MEDICINE
Payer: COMMERCIAL

## 2023-10-26 VITALS
HEART RATE: 68 BPM | OXYGEN SATURATION: 100 % | DIASTOLIC BLOOD PRESSURE: 68 MMHG | RESPIRATION RATE: 18 BRPM | SYSTOLIC BLOOD PRESSURE: 107 MMHG

## 2023-10-26 VITALS
WEIGHT: 117.95 LBS | SYSTOLIC BLOOD PRESSURE: 127 MMHG | HEART RATE: 56 BPM | HEIGHT: 65 IN | TEMPERATURE: 98 F | RESPIRATION RATE: 18 BRPM | OXYGEN SATURATION: 99 % | DIASTOLIC BLOOD PRESSURE: 85 MMHG

## 2023-10-26 DIAGNOSIS — K08.409 PARTIAL LOSS OF TEETH, UNSPECIFIED CAUSE, UNSPECIFIED CLASS: Chronic | ICD-10-CM

## 2023-10-26 DIAGNOSIS — Z98.890 OTHER SPECIFIED POSTPROCEDURAL STATES: Chronic | ICD-10-CM

## 2023-10-26 LAB
ALBUMIN SERPL ELPH-MCNC: 4.1 G/DL — SIGNIFICANT CHANGE UP (ref 3.3–5)
ALBUMIN SERPL ELPH-MCNC: 4.1 G/DL — SIGNIFICANT CHANGE UP (ref 3.3–5)
ALP SERPL-CCNC: 47 U/L — SIGNIFICANT CHANGE UP (ref 40–120)
ALP SERPL-CCNC: 47 U/L — SIGNIFICANT CHANGE UP (ref 40–120)
ALT FLD-CCNC: 17 U/L — SIGNIFICANT CHANGE UP (ref 10–45)
ALT FLD-CCNC: 17 U/L — SIGNIFICANT CHANGE UP (ref 10–45)
ANION GAP SERPL CALC-SCNC: 10 MMOL/L — SIGNIFICANT CHANGE UP (ref 5–17)
ANION GAP SERPL CALC-SCNC: 10 MMOL/L — SIGNIFICANT CHANGE UP (ref 5–17)
AST SERPL-CCNC: 20 U/L — SIGNIFICANT CHANGE UP (ref 10–40)
AST SERPL-CCNC: 20 U/L — SIGNIFICANT CHANGE UP (ref 10–40)
BASOPHILS # BLD AUTO: 0.04 K/UL — SIGNIFICANT CHANGE UP (ref 0–0.2)
BASOPHILS # BLD AUTO: 0.04 K/UL — SIGNIFICANT CHANGE UP (ref 0–0.2)
BASOPHILS NFR BLD AUTO: 0.6 % — SIGNIFICANT CHANGE UP (ref 0–2)
BASOPHILS NFR BLD AUTO: 0.6 % — SIGNIFICANT CHANGE UP (ref 0–2)
BILIRUB SERPL-MCNC: 0.8 MG/DL — SIGNIFICANT CHANGE UP (ref 0.2–1.2)
BILIRUB SERPL-MCNC: 0.8 MG/DL — SIGNIFICANT CHANGE UP (ref 0.2–1.2)
BUN SERPL-MCNC: 6 MG/DL — LOW (ref 7–23)
BUN SERPL-MCNC: 6 MG/DL — LOW (ref 7–23)
CALCIUM SERPL-MCNC: 9.4 MG/DL — SIGNIFICANT CHANGE UP (ref 8.4–10.5)
CALCIUM SERPL-MCNC: 9.4 MG/DL — SIGNIFICANT CHANGE UP (ref 8.4–10.5)
CHLORIDE SERPL-SCNC: 104 MMOL/L — SIGNIFICANT CHANGE UP (ref 96–108)
CHLORIDE SERPL-SCNC: 104 MMOL/L — SIGNIFICANT CHANGE UP (ref 96–108)
CO2 SERPL-SCNC: 26 MMOL/L — SIGNIFICANT CHANGE UP (ref 22–31)
CO2 SERPL-SCNC: 26 MMOL/L — SIGNIFICANT CHANGE UP (ref 22–31)
CREAT SERPL-MCNC: 0.62 MG/DL — SIGNIFICANT CHANGE UP (ref 0.5–1.3)
CREAT SERPL-MCNC: 0.62 MG/DL — SIGNIFICANT CHANGE UP (ref 0.5–1.3)
EGFR: 113 ML/MIN/1.73M2 — SIGNIFICANT CHANGE UP
EGFR: 113 ML/MIN/1.73M2 — SIGNIFICANT CHANGE UP
EOSINOPHIL # BLD AUTO: 0.15 K/UL — SIGNIFICANT CHANGE UP (ref 0–0.5)
EOSINOPHIL # BLD AUTO: 0.15 K/UL — SIGNIFICANT CHANGE UP (ref 0–0.5)
EOSINOPHIL NFR BLD AUTO: 2.3 % — SIGNIFICANT CHANGE UP (ref 0–6)
EOSINOPHIL NFR BLD AUTO: 2.3 % — SIGNIFICANT CHANGE UP (ref 0–6)
FLUAV AG NPH QL: SIGNIFICANT CHANGE UP
FLUAV AG NPH QL: SIGNIFICANT CHANGE UP
FLUBV AG NPH QL: SIGNIFICANT CHANGE UP
FLUBV AG NPH QL: SIGNIFICANT CHANGE UP
GLUCOSE SERPL-MCNC: 101 MG/DL — HIGH (ref 70–99)
GLUCOSE SERPL-MCNC: 101 MG/DL — HIGH (ref 70–99)
HCG SERPL-ACNC: <2 MIU/ML — SIGNIFICANT CHANGE UP
HCG SERPL-ACNC: <2 MIU/ML — SIGNIFICANT CHANGE UP
HCT VFR BLD CALC: 36.4 % — SIGNIFICANT CHANGE UP (ref 34.5–45)
HCT VFR BLD CALC: 36.4 % — SIGNIFICANT CHANGE UP (ref 34.5–45)
HGB BLD-MCNC: 12.1 G/DL — SIGNIFICANT CHANGE UP (ref 11.5–15.5)
HGB BLD-MCNC: 12.1 G/DL — SIGNIFICANT CHANGE UP (ref 11.5–15.5)
IMM GRANULOCYTES NFR BLD AUTO: 0.5 % — SIGNIFICANT CHANGE UP (ref 0–0.9)
IMM GRANULOCYTES NFR BLD AUTO: 0.5 % — SIGNIFICANT CHANGE UP (ref 0–0.9)
LYMPHOCYTES # BLD AUTO: 1.39 K/UL — SIGNIFICANT CHANGE UP (ref 1–3.3)
LYMPHOCYTES # BLD AUTO: 1.39 K/UL — SIGNIFICANT CHANGE UP (ref 1–3.3)
LYMPHOCYTES # BLD AUTO: 21 % — SIGNIFICANT CHANGE UP (ref 13–44)
LYMPHOCYTES # BLD AUTO: 21 % — SIGNIFICANT CHANGE UP (ref 13–44)
MAGNESIUM SERPL-MCNC: 1.9 MG/DL — SIGNIFICANT CHANGE UP (ref 1.6–2.6)
MAGNESIUM SERPL-MCNC: 1.9 MG/DL — SIGNIFICANT CHANGE UP (ref 1.6–2.6)
MCHC RBC-ENTMCNC: 30 PG — SIGNIFICANT CHANGE UP (ref 27–34)
MCHC RBC-ENTMCNC: 30 PG — SIGNIFICANT CHANGE UP (ref 27–34)
MCHC RBC-ENTMCNC: 33.2 GM/DL — SIGNIFICANT CHANGE UP (ref 32–36)
MCHC RBC-ENTMCNC: 33.2 GM/DL — SIGNIFICANT CHANGE UP (ref 32–36)
MCV RBC AUTO: 90.3 FL — SIGNIFICANT CHANGE UP (ref 80–100)
MCV RBC AUTO: 90.3 FL — SIGNIFICANT CHANGE UP (ref 80–100)
MONOCYTES # BLD AUTO: 0.59 K/UL — SIGNIFICANT CHANGE UP (ref 0–0.9)
MONOCYTES # BLD AUTO: 0.59 K/UL — SIGNIFICANT CHANGE UP (ref 0–0.9)
MONOCYTES NFR BLD AUTO: 8.9 % — SIGNIFICANT CHANGE UP (ref 2–14)
MONOCYTES NFR BLD AUTO: 8.9 % — SIGNIFICANT CHANGE UP (ref 2–14)
NEUTROPHILS # BLD AUTO: 4.43 K/UL — SIGNIFICANT CHANGE UP (ref 1.8–7.4)
NEUTROPHILS # BLD AUTO: 4.43 K/UL — SIGNIFICANT CHANGE UP (ref 1.8–7.4)
NEUTROPHILS NFR BLD AUTO: 66.7 % — SIGNIFICANT CHANGE UP (ref 43–77)
NEUTROPHILS NFR BLD AUTO: 66.7 % — SIGNIFICANT CHANGE UP (ref 43–77)
NRBC # BLD: 0 /100 WBCS — SIGNIFICANT CHANGE UP (ref 0–0)
NRBC # BLD: 0 /100 WBCS — SIGNIFICANT CHANGE UP (ref 0–0)
PLATELET # BLD AUTO: 283 K/UL — SIGNIFICANT CHANGE UP (ref 150–400)
PLATELET # BLD AUTO: 283 K/UL — SIGNIFICANT CHANGE UP (ref 150–400)
POTASSIUM SERPL-MCNC: 4 MMOL/L — SIGNIFICANT CHANGE UP (ref 3.5–5.3)
POTASSIUM SERPL-MCNC: 4 MMOL/L — SIGNIFICANT CHANGE UP (ref 3.5–5.3)
POTASSIUM SERPL-SCNC: 4 MMOL/L — SIGNIFICANT CHANGE UP (ref 3.5–5.3)
POTASSIUM SERPL-SCNC: 4 MMOL/L — SIGNIFICANT CHANGE UP (ref 3.5–5.3)
PROT SERPL-MCNC: 7.2 G/DL — SIGNIFICANT CHANGE UP (ref 6–8.3)
PROT SERPL-MCNC: 7.2 G/DL — SIGNIFICANT CHANGE UP (ref 6–8.3)
RBC # BLD: 4.03 M/UL — SIGNIFICANT CHANGE UP (ref 3.8–5.2)
RBC # BLD: 4.03 M/UL — SIGNIFICANT CHANGE UP (ref 3.8–5.2)
RBC # FLD: 13.5 % — SIGNIFICANT CHANGE UP (ref 10.3–14.5)
RBC # FLD: 13.5 % — SIGNIFICANT CHANGE UP (ref 10.3–14.5)
RSV RNA NPH QL NAA+NON-PROBE: SIGNIFICANT CHANGE UP
RSV RNA NPH QL NAA+NON-PROBE: SIGNIFICANT CHANGE UP
SARS-COV-2 RNA SPEC QL NAA+PROBE: SIGNIFICANT CHANGE UP
SARS-COV-2 RNA SPEC QL NAA+PROBE: SIGNIFICANT CHANGE UP
SODIUM SERPL-SCNC: 140 MMOL/L — SIGNIFICANT CHANGE UP (ref 135–145)
SODIUM SERPL-SCNC: 140 MMOL/L — SIGNIFICANT CHANGE UP (ref 135–145)
WBC # BLD: 6.63 K/UL — SIGNIFICANT CHANGE UP (ref 3.8–10.5)
WBC # BLD: 6.63 K/UL — SIGNIFICANT CHANGE UP (ref 3.8–10.5)
WBC # FLD AUTO: 6.63 K/UL — SIGNIFICANT CHANGE UP (ref 3.8–10.5)
WBC # FLD AUTO: 6.63 K/UL — SIGNIFICANT CHANGE UP (ref 3.8–10.5)

## 2023-10-26 PROCEDURE — G1004: CPT

## 2023-10-26 PROCEDURE — 85025 COMPLETE CBC W/AUTO DIFF WBC: CPT

## 2023-10-26 PROCEDURE — 70450 CT HEAD/BRAIN W/O DYE: CPT | Mod: 26,MG,59

## 2023-10-26 PROCEDURE — 70496 CT ANGIOGRAPHY HEAD: CPT | Mod: MG

## 2023-10-26 PROCEDURE — 99285 EMERGENCY DEPT VISIT HI MDM: CPT

## 2023-10-26 PROCEDURE — 70496 CT ANGIOGRAPHY HEAD: CPT | Mod: 26,MG

## 2023-10-26 PROCEDURE — 70498 CT ANGIOGRAPHY NECK: CPT | Mod: 26,MG

## 2023-10-26 PROCEDURE — 70498 CT ANGIOGRAPHY NECK: CPT | Mod: MG

## 2023-10-26 PROCEDURE — 99284 EMERGENCY DEPT VISIT MOD MDM: CPT | Mod: 25

## 2023-10-26 PROCEDURE — 80053 COMPREHEN METABOLIC PANEL: CPT

## 2023-10-26 PROCEDURE — 84702 CHORIONIC GONADOTROPIN TEST: CPT

## 2023-10-26 PROCEDURE — 87637 SARSCOV2&INF A&B&RSV AMP PRB: CPT

## 2023-10-26 PROCEDURE — 70450 CT HEAD/BRAIN W/O DYE: CPT | Mod: MG

## 2023-10-26 PROCEDURE — 96375 TX/PRO/DX INJ NEW DRUG ADDON: CPT | Mod: XU

## 2023-10-26 PROCEDURE — 96374 THER/PROPH/DIAG INJ IV PUSH: CPT | Mod: XU

## 2023-10-26 PROCEDURE — 83735 ASSAY OF MAGNESIUM: CPT

## 2023-10-26 RX ORDER — ACETAMINOPHEN 500 MG
1000 TABLET ORAL ONCE
Refills: 0 | Status: COMPLETED | OUTPATIENT
Start: 2023-10-26 | End: 2023-10-26

## 2023-10-26 RX ORDER — METOCLOPRAMIDE HCL 10 MG
10 TABLET ORAL ONCE
Refills: 0 | Status: COMPLETED | OUTPATIENT
Start: 2023-10-26 | End: 2023-10-26

## 2023-10-26 RX ORDER — DEXAMETHASONE 0.5 MG/5ML
10 ELIXIR ORAL ONCE
Refills: 0 | Status: COMPLETED | OUTPATIENT
Start: 2023-10-26 | End: 2023-10-26

## 2023-10-26 RX ORDER — SODIUM CHLORIDE 9 MG/ML
1000 INJECTION INTRAMUSCULAR; INTRAVENOUS; SUBCUTANEOUS ONCE
Refills: 0 | Status: COMPLETED | OUTPATIENT
Start: 2023-10-26 | End: 2023-10-26

## 2023-10-26 RX ADMIN — Medication 10 MILLIGRAM(S): at 10:21

## 2023-10-26 RX ADMIN — Medication 400 MILLIGRAM(S): at 10:22

## 2023-10-26 RX ADMIN — SODIUM CHLORIDE 1000 MILLILITER(S): 9 INJECTION INTRAMUSCULAR; INTRAVENOUS; SUBCUTANEOUS at 10:27

## 2023-10-26 RX ADMIN — Medication 102 MILLIGRAM(S): at 13:21

## 2023-10-26 NOTE — ED PROVIDER NOTE - PHYSICAL EXAMINATION
GENERAL: NAD  HEENT:  Atraumatic  CHEST/LUNG: Chest rise equal bilaterally  HEART: Regular rate and rhythm  ABDOMEN: Soft, Nontender, Nondistended  EXTREMITIES:  Extremities warm  PSYCH: A&Ox3  SKIN: No obvious rashes or lesions  MSK: No cervical spine TTP, able to range neck to the left and right   NEUROLOGY: strength and sensation intact in all extremities. CN 2 - 12 intact. No pronator drift. Ambulatory without difficulty.

## 2023-10-26 NOTE — ED PROVIDER NOTE - CLINICAL SUMMARY MEDICAL DECISION MAKING FREE TEXT BOX
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 Pt admits to nasal congestion/cough/URI symptoms this past week, otherwise asymptomatic. Denies fevers, chills, vomiting, dizziness, chest pain, SOB, abdominal pain, dysuria, hematuria. Benign neuro examination. Given pt's hx of carotid dissections, will obtain labs, CT imaging, CXR/Flu/COVID swab for URI symptoms, and reassess w/ symptomatic support. 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 Pt admits to nasal congestion/cough/URI symptoms this past week, otherwise asymptomatic. Denies fevers, chills, vomiting, dizziness, chest pain, SOB, abdominal pain, dysuria, hematuria. Benign neuro examination. Given pt's hx of carotid dissections, will obtain labs, CT imaging, CXR/Flu/COVID swab for URI symptoms, and reassess w/ symptomatic support.    Gage: 42 year old female with b/l carotid dissection, here with headache 3 hours, photophobia, sensitivity to sound/nausea.  will get labs, pain control, ct imaging, rvp r/o uri symptoms.  will reassess.

## 2023-10-26 NOTE — CONSULT NOTE ADULT - ASSESSMENT
42F h/o vascular EDS, fibromuscular dysplasia d/x 2017, known LICA dissection p/f sudden-onset migraine-like HA but more severe, onset at 7AM, not positional, worsened by light/sound. CTH wnl no heme. CTA w/ stable LICA dissection with small pseudoaneurysm. Exam: intact   - No acute neurosurgical intervention   - Outpatient f/u with patient's neurologist and pain mgmt physician   - D/c with pain meds per ED   - Outpatient f/u Dr. Petersen as per routine

## 2023-10-26 NOTE — ED PROVIDER NOTE - PROGRESS NOTE DETAILS
Delio HANSON: Pt is stable and ready for discharge. Strict return precautions given. All questions answered. Pt admits to a present headache w/ partial improvement and would like to go home, aware of CT findings.

## 2023-10-26 NOTE — ED PROVIDER NOTE - PATIENT PORTAL LINK FT
You can access the FollowMyHealth Patient Portal offered by [facility] by registering at the following website: [email or web address]. By joining [facility]’s FollowMyHealth portal, you will also be able to view your health information using other applications (apps) compatible with our system.

## 2023-10-26 NOTE — CONSULT NOTE ADULT - SUBJECTIVE AND OBJECTIVE BOX
p ([id number])     HPI: 42F h/o vascular EDS, fibromuscular dysplasia d/x 2017, known LICA dissection p/f sudden-onset migraine-like HA but more severe, onset at 7AM, not positional, worsened by light/sound. CTH wnl no heme. CTA w/ stable LICA dissection with small pseudoaneurysm.     Exam: intact      --Anticoagulation:    =====================  PAST MEDICAL HISTORY   History of fibromuscular dysplasia    Seasonal allergies    Ulcerative proctitis      PAST SURGICAL HISTORY   H/O rhinoplasty    S/P LASIK surgery of both eyes    [place] teeth extracted      No Known Allergies      MEDICATIONS:  Antibiotics:    Neuro:    Other:      SOCIAL HISTORY:   Occupation:   Marital Status:     FAMILY HISTORY:  FH: HTN (hypertension) (Mother)    Family history of cardiac pacemaker (Father)        ROS: Negative except per HPI    LABS:                          12.1   6.63  )-----------( 283      ( 26 Oct 2023 10:15 )             36.4     10-26    140  |  104  |  6<L>  ----------------------------<  101<H>  4.0   |  26  |  0.62    Ca    9.4      26 Oct 2023 10:15  Mg     1.9     10-26    TPro  7.2  /  Alb  4.1  /  TBili  0.8  /  DBili  x   /  AST  20  /  ALT  17  /  AlkPhos  47  10-26

## 2023-10-26 NOTE — ED PROVIDER NOTE - OBJECTIVE STATEMENT
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 Pt admits to nasal congestion/cough/URI symptoms this past week, otherwise asymptomatic. Denies fevers, chills, vomiting, dizziness, chest pain, SOB, abdominal pain, dysuria, hematuria.

## 2023-10-26 NOTE — ED ADULT NURSE NOTE - OBJECTIVE STATEMENT
43 y/o F A&Ox4 w/ PMH of carotid dissection, renal artery aneurysm presents to ED complaining of headache. Pt states that she has been suffering a severe, diffuse headache for about 3 hours with associated photophobia. Pt endorses nausea as well. Pt states this "feels similar to her previous episodes." Pt denies blurred or double vision, vomiting, diarrhea, chest pain and SOB.

## 2023-10-26 NOTE — ED PROVIDER NOTE - NSFOLLOWUPINSTRUCTIONS_ED_ALL_ED_FT
Headache    A headache is pain or discomfort felt around the head or neck area. The specific cause of a headache may not be found as there are many types including tension headaches, migraine headaches, and cluster headaches. Watch your condition for any changes. Things you can do to manage your pain include taking over the counter and prescription medications as instructed by your health care provider, lying down in a dark quiet room, limiting stress, getting regular sleep, and refraining from alcohol and tobacco products.    SEEK IMMEDIATE MEDICAL CARE IF YOU HAVE ANY OF THE FOLLOWING SYMPTOMS: fever, vomiting, stiff neck, loss of vision, problems with speech, muscle weakness, loss of balance, trouble walking, passing out, or confusion.    The results of any blood tests and imaging studies completed during your visit today were discussed and explained to you and a copy provided with your discharge instructions. Please follow up with your primary care doctor within 48 hours.

## 2023-10-31 ENCOUNTER — EMERGENCY (EMERGENCY)
Facility: HOSPITAL | Age: 43
LOS: 1 days | Discharge: HOME | End: 2023-10-31
Attending: EMERGENCY MEDICINE | Admitting: EMERGENCY MEDICINE
Payer: COMMERCIAL

## 2023-10-31 VITALS
WEIGHT: 117.95 LBS | TEMPERATURE: 98 F | HEIGHT: 65 IN | OXYGEN SATURATION: 97 % | DIASTOLIC BLOOD PRESSURE: 74 MMHG | SYSTOLIC BLOOD PRESSURE: 114 MMHG | RESPIRATION RATE: 20 BRPM | HEART RATE: 68 BPM

## 2023-10-31 DIAGNOSIS — Z98.890 OTHER SPECIFIED POSTPROCEDURAL STATES: Chronic | ICD-10-CM

## 2023-10-31 DIAGNOSIS — K08.409 PARTIAL LOSS OF TEETH, UNSPECIFIED CAUSE, UNSPECIFIED CLASS: Chronic | ICD-10-CM

## 2023-10-31 LAB
ALBUMIN SERPL ELPH-MCNC: 3.6 G/DL — SIGNIFICANT CHANGE UP (ref 3.3–5)
ALBUMIN SERPL ELPH-MCNC: 3.6 G/DL — SIGNIFICANT CHANGE UP (ref 3.3–5)
ALP SERPL-CCNC: 44 U/L — SIGNIFICANT CHANGE UP (ref 40–120)
ALP SERPL-CCNC: 44 U/L — SIGNIFICANT CHANGE UP (ref 40–120)
ALT FLD-CCNC: 35 U/L — SIGNIFICANT CHANGE UP (ref 12–78)
ALT FLD-CCNC: 35 U/L — SIGNIFICANT CHANGE UP (ref 12–78)
ANION GAP SERPL CALC-SCNC: 8 MMOL/L — SIGNIFICANT CHANGE UP (ref 5–17)
ANION GAP SERPL CALC-SCNC: 8 MMOL/L — SIGNIFICANT CHANGE UP (ref 5–17)
APPEARANCE UR: CLEAR — SIGNIFICANT CHANGE UP
APPEARANCE UR: CLEAR — SIGNIFICANT CHANGE UP
APTT BLD: 24 SEC — LOW (ref 24.5–35.6)
APTT BLD: 24 SEC — LOW (ref 24.5–35.6)
AST SERPL-CCNC: 24 U/L — SIGNIFICANT CHANGE UP (ref 15–37)
AST SERPL-CCNC: 24 U/L — SIGNIFICANT CHANGE UP (ref 15–37)
BASOPHILS # BLD AUTO: 0.05 K/UL — SIGNIFICANT CHANGE UP (ref 0–0.2)
BASOPHILS # BLD AUTO: 0.05 K/UL — SIGNIFICANT CHANGE UP (ref 0–0.2)
BASOPHILS NFR BLD AUTO: 0.6 % — SIGNIFICANT CHANGE UP (ref 0–2)
BASOPHILS NFR BLD AUTO: 0.6 % — SIGNIFICANT CHANGE UP (ref 0–2)
BILIRUB SERPL-MCNC: 0.9 MG/DL — SIGNIFICANT CHANGE UP (ref 0.2–1.2)
BILIRUB SERPL-MCNC: 0.9 MG/DL — SIGNIFICANT CHANGE UP (ref 0.2–1.2)
BILIRUB UR-MCNC: NEGATIVE — SIGNIFICANT CHANGE UP
BILIRUB UR-MCNC: NEGATIVE — SIGNIFICANT CHANGE UP
BUN SERPL-MCNC: 8 MG/DL — SIGNIFICANT CHANGE UP (ref 7–23)
BUN SERPL-MCNC: 8 MG/DL — SIGNIFICANT CHANGE UP (ref 7–23)
CALCIUM SERPL-MCNC: 8.8 MG/DL — SIGNIFICANT CHANGE UP (ref 8.5–10.1)
CALCIUM SERPL-MCNC: 8.8 MG/DL — SIGNIFICANT CHANGE UP (ref 8.5–10.1)
CHLORIDE SERPL-SCNC: 108 MMOL/L — SIGNIFICANT CHANGE UP (ref 96–108)
CHLORIDE SERPL-SCNC: 108 MMOL/L — SIGNIFICANT CHANGE UP (ref 96–108)
CO2 SERPL-SCNC: 24 MMOL/L — SIGNIFICANT CHANGE UP (ref 22–31)
CO2 SERPL-SCNC: 24 MMOL/L — SIGNIFICANT CHANGE UP (ref 22–31)
COLOR SPEC: YELLOW — SIGNIFICANT CHANGE UP
COLOR SPEC: YELLOW — SIGNIFICANT CHANGE UP
CREAT SERPL-MCNC: 0.66 MG/DL — SIGNIFICANT CHANGE UP (ref 0.5–1.3)
CREAT SERPL-MCNC: 0.66 MG/DL — SIGNIFICANT CHANGE UP (ref 0.5–1.3)
DIFF PNL FLD: ABNORMAL
DIFF PNL FLD: ABNORMAL
EGFR: 111 ML/MIN/1.73M2 — SIGNIFICANT CHANGE UP
EGFR: 111 ML/MIN/1.73M2 — SIGNIFICANT CHANGE UP
EOSINOPHIL # BLD AUTO: 0.13 K/UL — SIGNIFICANT CHANGE UP (ref 0–0.5)
EOSINOPHIL # BLD AUTO: 0.13 K/UL — SIGNIFICANT CHANGE UP (ref 0–0.5)
EOSINOPHIL NFR BLD AUTO: 1.4 % — SIGNIFICANT CHANGE UP (ref 0–6)
EOSINOPHIL NFR BLD AUTO: 1.4 % — SIGNIFICANT CHANGE UP (ref 0–6)
GLUCOSE SERPL-MCNC: 94 MG/DL — SIGNIFICANT CHANGE UP (ref 70–99)
GLUCOSE SERPL-MCNC: 94 MG/DL — SIGNIFICANT CHANGE UP (ref 70–99)
GLUCOSE UR QL: NEGATIVE MG/DL — SIGNIFICANT CHANGE UP
GLUCOSE UR QL: NEGATIVE MG/DL — SIGNIFICANT CHANGE UP
HCG SERPL-ACNC: <1 MIU/ML — SIGNIFICANT CHANGE UP
HCG SERPL-ACNC: <1 MIU/ML — SIGNIFICANT CHANGE UP
HCT VFR BLD CALC: 35.6 % — SIGNIFICANT CHANGE UP (ref 34.5–45)
HCT VFR BLD CALC: 35.6 % — SIGNIFICANT CHANGE UP (ref 34.5–45)
HGB BLD-MCNC: 11.7 G/DL — SIGNIFICANT CHANGE UP (ref 11.5–15.5)
HGB BLD-MCNC: 11.7 G/DL — SIGNIFICANT CHANGE UP (ref 11.5–15.5)
IMM GRANULOCYTES NFR BLD AUTO: 0.3 % — SIGNIFICANT CHANGE UP (ref 0–0.9)
IMM GRANULOCYTES NFR BLD AUTO: 0.3 % — SIGNIFICANT CHANGE UP (ref 0–0.9)
INR BLD: 0.96 RATIO — SIGNIFICANT CHANGE UP (ref 0.85–1.18)
INR BLD: 0.96 RATIO — SIGNIFICANT CHANGE UP (ref 0.85–1.18)
KETONES UR-MCNC: NEGATIVE MG/DL — SIGNIFICANT CHANGE UP
KETONES UR-MCNC: NEGATIVE MG/DL — SIGNIFICANT CHANGE UP
LEUKOCYTE ESTERASE UR-ACNC: ABNORMAL
LEUKOCYTE ESTERASE UR-ACNC: ABNORMAL
LYMPHOCYTES # BLD AUTO: 0.87 K/UL — LOW (ref 1–3.3)
LYMPHOCYTES # BLD AUTO: 0.87 K/UL — LOW (ref 1–3.3)
LYMPHOCYTES # BLD AUTO: 9.6 % — LOW (ref 13–44)
LYMPHOCYTES # BLD AUTO: 9.6 % — LOW (ref 13–44)
MCHC RBC-ENTMCNC: 29.5 PG — SIGNIFICANT CHANGE UP (ref 27–34)
MCHC RBC-ENTMCNC: 29.5 PG — SIGNIFICANT CHANGE UP (ref 27–34)
MCHC RBC-ENTMCNC: 32.9 GM/DL — SIGNIFICANT CHANGE UP (ref 32–36)
MCHC RBC-ENTMCNC: 32.9 GM/DL — SIGNIFICANT CHANGE UP (ref 32–36)
MCV RBC AUTO: 89.7 FL — SIGNIFICANT CHANGE UP (ref 80–100)
MCV RBC AUTO: 89.7 FL — SIGNIFICANT CHANGE UP (ref 80–100)
MONOCYTES # BLD AUTO: 0.58 K/UL — SIGNIFICANT CHANGE UP (ref 0–0.9)
MONOCYTES # BLD AUTO: 0.58 K/UL — SIGNIFICANT CHANGE UP (ref 0–0.9)
MONOCYTES NFR BLD AUTO: 6.4 % — SIGNIFICANT CHANGE UP (ref 2–14)
MONOCYTES NFR BLD AUTO: 6.4 % — SIGNIFICANT CHANGE UP (ref 2–14)
NEUTROPHILS # BLD AUTO: 7.43 K/UL — HIGH (ref 1.8–7.4)
NEUTROPHILS # BLD AUTO: 7.43 K/UL — HIGH (ref 1.8–7.4)
NEUTROPHILS NFR BLD AUTO: 81.7 % — HIGH (ref 43–77)
NEUTROPHILS NFR BLD AUTO: 81.7 % — HIGH (ref 43–77)
NITRITE UR-MCNC: NEGATIVE — SIGNIFICANT CHANGE UP
NITRITE UR-MCNC: NEGATIVE — SIGNIFICANT CHANGE UP
NRBC # BLD: 0 /100 WBCS — SIGNIFICANT CHANGE UP (ref 0–0)
NRBC # BLD: 0 /100 WBCS — SIGNIFICANT CHANGE UP (ref 0–0)
PH UR: 8 — SIGNIFICANT CHANGE UP (ref 5–8)
PH UR: 8 — SIGNIFICANT CHANGE UP (ref 5–8)
PLATELET # BLD AUTO: 243 K/UL — SIGNIFICANT CHANGE UP (ref 150–400)
PLATELET # BLD AUTO: 243 K/UL — SIGNIFICANT CHANGE UP (ref 150–400)
POTASSIUM SERPL-MCNC: 3.9 MMOL/L — SIGNIFICANT CHANGE UP (ref 3.5–5.3)
POTASSIUM SERPL-MCNC: 3.9 MMOL/L — SIGNIFICANT CHANGE UP (ref 3.5–5.3)
POTASSIUM SERPL-SCNC: 3.9 MMOL/L — SIGNIFICANT CHANGE UP (ref 3.5–5.3)
POTASSIUM SERPL-SCNC: 3.9 MMOL/L — SIGNIFICANT CHANGE UP (ref 3.5–5.3)
PROT SERPL-MCNC: 7.4 G/DL — SIGNIFICANT CHANGE UP (ref 6–8.3)
PROT SERPL-MCNC: 7.4 G/DL — SIGNIFICANT CHANGE UP (ref 6–8.3)
PROT UR-MCNC: NEGATIVE MG/DL — SIGNIFICANT CHANGE UP
PROT UR-MCNC: NEGATIVE MG/DL — SIGNIFICANT CHANGE UP
PROTHROM AB SERPL-ACNC: 11.3 SEC — SIGNIFICANT CHANGE UP (ref 9.5–13)
PROTHROM AB SERPL-ACNC: 11.3 SEC — SIGNIFICANT CHANGE UP (ref 9.5–13)
RBC # BLD: 3.97 M/UL — SIGNIFICANT CHANGE UP (ref 3.8–5.2)
RBC # BLD: 3.97 M/UL — SIGNIFICANT CHANGE UP (ref 3.8–5.2)
RBC # FLD: 13.2 % — SIGNIFICANT CHANGE UP (ref 10.3–14.5)
RBC # FLD: 13.2 % — SIGNIFICANT CHANGE UP (ref 10.3–14.5)
SODIUM SERPL-SCNC: 140 MMOL/L — SIGNIFICANT CHANGE UP (ref 135–145)
SODIUM SERPL-SCNC: 140 MMOL/L — SIGNIFICANT CHANGE UP (ref 135–145)
SP GR SPEC: 1 — SIGNIFICANT CHANGE UP (ref 1–1.03)
SP GR SPEC: 1 — SIGNIFICANT CHANGE UP (ref 1–1.03)
UROBILINOGEN FLD QL: 0.2 MG/DL — SIGNIFICANT CHANGE UP (ref 0.2–1)
UROBILINOGEN FLD QL: 0.2 MG/DL — SIGNIFICANT CHANGE UP (ref 0.2–1)
WBC # BLD: 9.09 K/UL — SIGNIFICANT CHANGE UP (ref 3.8–10.5)
WBC # BLD: 9.09 K/UL — SIGNIFICANT CHANGE UP (ref 3.8–10.5)
WBC # FLD AUTO: 9.09 K/UL — SIGNIFICANT CHANGE UP (ref 3.8–10.5)
WBC # FLD AUTO: 9.09 K/UL — SIGNIFICANT CHANGE UP (ref 3.8–10.5)

## 2023-10-31 PROCEDURE — 85730 THROMBOPLASTIN TIME PARTIAL: CPT

## 2023-10-31 PROCEDURE — 84702 CHORIONIC GONADOTROPIN TEST: CPT

## 2023-10-31 PROCEDURE — 96374 THER/PROPH/DIAG INJ IV PUSH: CPT | Mod: XU

## 2023-10-31 PROCEDURE — 87077 CULTURE AEROBIC IDENTIFY: CPT

## 2023-10-31 PROCEDURE — 74174 CTA ABD&PLVS W/CONTRAST: CPT | Mod: 26,MA

## 2023-10-31 PROCEDURE — 71275 CT ANGIOGRAPHY CHEST: CPT | Mod: MA

## 2023-10-31 PROCEDURE — 80053 COMPREHEN METABOLIC PANEL: CPT

## 2023-10-31 PROCEDURE — 87186 SC STD MICRODIL/AGAR DIL: CPT

## 2023-10-31 PROCEDURE — 36415 COLL VENOUS BLD VENIPUNCTURE: CPT

## 2023-10-31 PROCEDURE — 81001 URINALYSIS AUTO W/SCOPE: CPT

## 2023-10-31 PROCEDURE — 96375 TX/PRO/DX INJ NEW DRUG ADDON: CPT | Mod: XU

## 2023-10-31 PROCEDURE — 71275 CT ANGIOGRAPHY CHEST: CPT | Mod: 26,MA

## 2023-10-31 PROCEDURE — 99285 EMERGENCY DEPT VISIT HI MDM: CPT

## 2023-10-31 PROCEDURE — 99284 EMERGENCY DEPT VISIT MOD MDM: CPT | Mod: 25

## 2023-10-31 PROCEDURE — 99283 EMERGENCY DEPT VISIT LOW MDM: CPT

## 2023-10-31 PROCEDURE — 85025 COMPLETE CBC W/AUTO DIFF WBC: CPT

## 2023-10-31 PROCEDURE — 85610 PROTHROMBIN TIME: CPT

## 2023-10-31 PROCEDURE — 87086 URINE CULTURE/COLONY COUNT: CPT

## 2023-10-31 PROCEDURE — 74174 CTA ABD&PLVS W/CONTRAST: CPT | Mod: MA

## 2023-10-31 RX ORDER — CEFPODOXIME PROXETIL 100 MG
1 TABLET ORAL
Qty: 20 | Refills: 0
Start: 2023-10-31 | End: 2023-11-09

## 2023-10-31 RX ORDER — RIVAROXABAN 15 MG-20MG
1 KIT ORAL
Qty: 30 | Refills: 0
Start: 2023-10-31 | End: 2023-11-29

## 2023-10-31 RX ORDER — ACETAMINOPHEN 500 MG
1000 TABLET ORAL ONCE
Refills: 0 | Status: COMPLETED | OUTPATIENT
Start: 2023-10-31 | End: 2023-10-31

## 2023-10-31 RX ORDER — RIVAROXABAN 15 MG-20MG
20 KIT ORAL ONCE
Refills: 0 | Status: COMPLETED | OUTPATIENT
Start: 2023-10-31 | End: 2023-10-31

## 2023-10-31 RX ORDER — SODIUM CHLORIDE 9 MG/ML
1000 INJECTION INTRAMUSCULAR; INTRAVENOUS; SUBCUTANEOUS ONCE
Refills: 0 | Status: COMPLETED | OUTPATIENT
Start: 2023-10-31 | End: 2023-10-31

## 2023-10-31 RX ORDER — METOCLOPRAMIDE HCL 10 MG
10 TABLET ORAL ONCE
Refills: 0 | Status: COMPLETED | OUTPATIENT
Start: 2023-10-31 | End: 2023-10-31

## 2023-10-31 RX ADMIN — RIVAROXABAN 20 MILLIGRAM(S): KIT at 15:26

## 2023-10-31 RX ADMIN — SODIUM CHLORIDE 1000 MILLILITER(S): 9 INJECTION INTRAMUSCULAR; INTRAVENOUS; SUBCUTANEOUS at 11:30

## 2023-10-31 RX ADMIN — Medication 400 MILLIGRAM(S): at 11:30

## 2023-10-31 RX ADMIN — Medication 104 MILLIGRAM(S): at 11:29

## 2023-10-31 NOTE — ED PROVIDER NOTE - CLINICAL SUMMARY MEDICAL DECISION MAKING FREE TEXT BOX
Patient complaining of right flank pain worsening for the past few days significantly worse this morning associated with nausea vomiting.  Patient relates symptoms similar to when she had a left renal infarct in the past.  Patient denies fevers chills chest pain short of breath cough diarrhea dysuria hematuria frequency.  Patient requesting   Reglan and Ofirme    Plan CTA chest abdomen pelvis labs IV fluid Reglan Ofirmev    Differential including but not limited to UTI pyelonephritis kidney stone renal infarction dissection electrolyte abnormality dehydration

## 2023-10-31 NOTE — ED ADULT NURSE NOTE - NSICDXPASTMEDICALHX_GEN_ALL_CORE_FT
PAST MEDICAL HISTORY:  History of fibromuscular dysplasia 09/2017 diagnosed in [place], follows vasc surgery at [facility]    Seasonal allergies     Ulcerative proctitis

## 2023-10-31 NOTE — ED PROVIDER NOTE - CARE PROVIDER_API CALL
RULA NELSON  [id number]  [place] [address]  Phone: [phone]  Fax: [phone]  Follow Up Time:

## 2023-10-31 NOTE — ED PROVIDER NOTE - NSICDXPASTSURGICALHX_GEN_ALL_CORE_FT
PAST SURGICAL HISTORY:  H/O rhinoplasty age 14    S/P LASIK surgery of both eyes 2005    [place] teeth extracted

## 2023-10-31 NOTE — CONSULT NOTE ADULT - SUBJECTIVE AND OBJECTIVE BOX
Vascular Attending:  Dr Hernandez      HPI:  41 y/o female w/ PMHx of fibromuscular dysplasia diagnosed in  after s/p bilateral carotid artery dissections & R vertebral artery dissection (2017) p/w RLQ. Pt is now followed periodically by vascular, Dr. 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 Pr recently presented to [place] ED with c/o headaches and CTA was unchanged. She reports having had congestion and stuffiness for past week as well as generalized malaise. She states that she has had RLQ discomfort for the past 4 days with sudden severe painthis am. The pain is described as sharp, severe and 10/10 radiating to R flamk. Patient  had been on Xarelto in past, but discontinued it after 6 months due to heavy menstrual bleeding and now takes ASA81 daily.     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 in  and she does not have FMD  Seasonal allergies  Ulcerative proctitis  H/O rhinoplasty  S/P LASIK surgery of both eyes   [place] teeth extracted  Loop recorder    REVIEW OF SYSTEMS  General: denies fevers or chills  Skin/Breast: denies	  Ophthalmologic: denies	  ENMT:	denies  Respiratory and Thorax: denies SOB, cough, PND, orthopnea	  Cardiovascular:	+palps  Gastrointestinal:	as above  Genitourinary:	denies dysuria  Musculoskeletal:	 as above  Neurological:	as above  Psychiatric:	denies  Hematology/Lymphatics:	denies  Endocrine:	denies  Allergic/Immunologic:	as above    MEDICATIONS  (STANDING):    MEDICATIONS  (PRN):      Allergies    No Known Allergies      SOCIAL HISTORY:  with 2 children. Non smoker. No ETOH      Vital Signs Last 24 Hrs  T(C): 36.4 (31 Oct 2023 10:19), Max: 36.4 (31 Oct 2023 10:19)  T(F): 97.5 (31 Oct 2023 10:19), Max: 97.5 (31 Oct 2023 10:19)  HR: 68 (31 Oct 2023 10:19) (68 - 68)  BP: 114/74 (31 Oct 2023 10:19) (114/74 - 114/74)  BP(mean): --  RR: 20 (31 Oct 2023 10:19) (20 - 20)  SpO2: 97% (31 Oct 2023 10:19) (97% - 97%)    Parameters below as of 31 Oct 2023 10:19  Patient On (Oxygen Delivery Method): room air        PHYSICAL EXAM:  Constitutional: WD F in NAD  Neck: No JVD  Respiratory: CTA  Cardiovascular: normal S1, S2  Gastrointestinal: sift, ND, NT, no pu;satile massess.   Extremities: No e/c/c  Neurological: A&O X 3, HOFF X 4=  Pulses:   Right:                                                                          Left:  FEM [x ]2+ [ ]1+ [ ]doppler                                             FEM [ x]2+ [ ]1+ [ ]doppler    POP [x ]2+ [ ]1+ [ ]doppler                                             POP [ x]2+ [ ]1+ [ ]doppler    DP [ x]2+ [ ]1+ [ ]doppler                                                DP [x ]2+ [ ]1+ [ ]doppler  PT[ ]2+ [ ]1+ [ ]doppler                                                  PT [ ]2+ [ ]1+ [ ]doppler      LABS:                        11.7   9.09  )-----------( 243      ( 31 Oct 2023 11:18 )             35.6     10-    140  |  108  |  8   ----------------------------<  94  3.9   |  24  |  0.66    Ca    8.8      31 Oct 2023 11:18    TPro  7.4  /  Alb  3.6  /  TBili  0.9  /  DBili  x   /  AST  24  /  ALT  35  /  AlkPhos  44  10-31    PT/INR - ( 31 Oct 2023 11:18 )   PT: 11.3 sec;   INR: 0.96 ratio         PTT - ( 31 Oct 2023 11:18 )  PTT:24.0 sec  Urinalysis Basic - ( 31 Oct 2023 12:00 )    Color: Yellow / Appearance: Clear / S.005 / pH: x  Gluc: x / Ketone: Negative mg/dL  / Bili: Negative / Urobili: 0.2 mg/dL   Blood: x / Protein: Negative mg/dL / Nitrite: Negative   Leuk Esterase: Small / RBC: 1 /HPF / WBC 2 /HPF   Sq Epi: x / Non Sq Epi: x / Bacteria: Occasional /HPF        RADIOLOGY & ADDITIONAL STUDIES    < from: CT Angio Chest Aorta w/wo IV Cont (10.31.23 @ 12:18) >  ACC: [id number] EXAM:  CT ANGIO ABD PELV (W)AW IC   ORDERED BY: ALEXIS CRANDALL     ACC: [id number] EXAM:  CT ANGIO CHEST AORTA WAWIC   ORDERED BY: ALEXIS CRANDALL     PROCEDURE DATE:  10/31/2023          INTERPRETATION:  CLINICAL INFORMATION: Abdominal pain, rule out   dissection history of fibromuscular dysplasia. Right flank pain.    COMPARISON: CT abdomen pelvis 10/21/2021.    CONTRAST/COMPLICATIONS:  IV Contrast: Omnipaque 350 (accession [id number]), IV contrast documented   in unlinked concurrent exam (accession [id number])  90 cc administered   10   cc discarded  Oral Contrast: NONE  Complications: None reported at time of study completion    PROCEDURE:  CT Angiography of the Chest, Abdomen and Pelvis.  Precontrast imaging was performed through the chest followed by arterial   phase imaging of the chest, abdomen and pelvis.  Sagittal and coronal reformats were performed as well as 3D (MIP)   reconstructions.    FINDINGS:    CHEST:  LUNGS AND LARGE AIRWAYS: Visualized central airways are patent. Biapical   scarring and scattered dependent lung scarring. No large focal   consolidation.  PLEURA: No pleural effusion or pneumothorax.  VESSELS: No intramural hematoma, aneurysm, or dissection of the thoracic   aorta within limitationsof this non-ECG gated study. Aortic root is   suboptimally assessed due to lack of ECG gating. Patent partially imaged   great vessels. Right subclavian artery is suboptimally assessed due to   streak artifact from adjacent contrast bolus. Main pulmonary artery size   is within normal limits. No central pulmonary embolus.  HEART: Heart size is qualitatively within normal limits. No enlarged   lymph nodes of the thorax. Esophagus is nondistended.  MEDIASTINUM AND ALVINO: Small volume nodes of the thorax. Esophagus is   nondistended.  CHEST WALL AND LOWER NECK: Subcutaneous cardiac device adjacent to the   sternum. No chest wall hematoma. Partially imaged thyroid is unremarkable.    ABDOMEN AND PELVIS:  LIVER: Liver size within normal limits. Diffuse steatosis.  BILE DUCTS: No distention  GALLBLADDER: Unremarkable CT appearance  SPLEEN: Spleen size within normal limits  PANCREAS: No acute peripancreatic inflammation  ADRENALS: Unremarkable  KIDNEYS/URETERS: New right renal wedge-shaped hypoattenuating foci,   images 125-130 series 3, concerning for renal infarcts. Pyelonephritis is   not entirely excluded. Distal right renal artery anterior branch is   thickened. Chronic left posterior renal infarct with volume loss. No   hydronephrosis of either kidney.    BLADDER: Minimally distended.  REPRODUCTIVE ORGANS: Uterus and adnexa are suboptimally characterized on   CT.    BOWEL: Stomach is underdistended. No small bowel distention. Appendix is   air/fluid-filled and patulous, similar to prior imaging from .  PERITONEUM: No ascites.  VESSELS: No abdominal aortic aneurysm or dissection. Left common iliac   artery is ectatic up to 13 mm at distal segment, similar to prior   imaging. Iliac arteries are patent. Celiac artery, SMA, and SHERWIN are   patent. Distal right renal artery anterior segmental branch is thickened.   Distal left renal artery is slightly ectatic, similar to prior study.  RETROPERITONEUM/LYMPH NODES: No enlarged lymph nodes of the   abdomen/pelvis.  ABDOMINAL WALL:Tiny fat-containing umbilical hernia.  BONES: Degenerative changes.    IMPRESSION:    No acute aortic pathology within the limitations of this non-ECG gated   study.    New right renal wedge-shaped hypoattenuating foci, images 125-130 series   3, concerning for renal infarcts. Pyelonephritis is not entirely excluded.    Distal right renal artery anterior branch is thickened. Findings may be   due to reported history of fibromuscular dysplasia versus other   vasculitis. Consider rheumatology evaluation.    Chronic left renal infarct. Ectatic distal left renal artery. Ectatic   distal left common iliac artery as well. Follow-up CTA is recommended in   6 months.    < end of copied text >

## 2023-10-31 NOTE — ED PROVIDER NOTE - OBJECTIVE STATEMENT
43 yo F PMHx fibromuscular dysplasia (FMD) since 2017 with b/l carotid dissection, with R tortuous renal artery and L renal artery pseudoaneurysm, chronic left renal infarct presents to ED c/o RLQ pain x a few days. this AM states pain became severe radiating to R flank with associated N/V. Pt denies fever/chills, hematuria, dysuria, chest pain, SOB, back pain.

## 2023-10-31 NOTE — ED ADULT TRIAGE NOTE - CHIEF COMPLAINT QUOTE
I have severe pain right kidney region.  it started in the night.  I threw up from the pain.  no known fevers.  no urinary symptoms.  I do have a significant medical history and have actually dissected my renal arteries (treated here in 2020) due to my vascular syndrome.  I called my specialist in the city and they told me to get to an er immediately.   dropped me off)

## 2023-10-31 NOTE — ED PROVIDER NOTE - PATIENT PORTAL LINK FT
You can access the FollowMyHealth Patient Portal offered by [facility] by registering at the following website: [email or web address]. By joining [facility]’s FollowMyHealth portal, you will also be able to view your health information using other applications (apps) compatible with our system.

## 2023-10-31 NOTE — ED PROVIDER NOTE - NSDCPRINTRESULTS_ED_ALL_ED
Topical Retinoid Pregnancy And Lactation Text: This medication is Pregnancy Category C. It is unknown if this medication is excreted in breast milk. Patient requests all Lab, Cardiology, and Radiology Results on their Discharge Instructions

## 2023-10-31 NOTE — ED ADULT NURSE NOTE - NSICDXPASTSURGICALHX_GEN_ALL_CORE_FT
PAST SURGICAL HISTORY:  H/O rhinoplasty age 14    S/P LASIK surgery of both eyes 2005    [place] teeth extracted

## 2023-10-31 NOTE — ED PROVIDER NOTE - PROGRESS NOTE DETAILS
pt improved. vascular recs appreciated. will restart xarelto and have pt fu for further embolic workup, Dr Byrne states this can be done outpatient. and pt agrees, does not want admission. As per discussion with attending, will cover for pyelo. Discussed the results of all diagnostic testing in ED and copies of all reports given.   Pt was given an opportunity to have all questions answered to satisfaction.  Discussed the importance of prompt, close medical follow-up. ED return precautions discussed at length.  Pt verbalizes agreement and understanding of plan and ED return precautions. Pt well appearing, stable for DC home. No emergent concerns at this time.

## 2023-10-31 NOTE — ED PROVIDER NOTE - DIFFERENTIAL DIAGNOSIS
Differential including but not limited to UTI pyelonephritis kidney stone renal infarction dissection electrolyte abnormality dehydration Differential Diagnosis

## 2023-10-31 NOTE — ED PROVIDER NOTE - NS ED ATTENDING STATEMENT MOD
This was a shared visit with the LISSETH. I reviewed and verified the documentation and independently performed the documented:

## 2023-10-31 NOTE — CONSULT NOTE ADULT - ASSESSMENT
41 y/o F with complex medical history presents with c/o RLQ and flank pain  Currently pain free  CTA with possible R RA infarct-not clearly identified  Concern for possible cardioembolic source as RA patent without dissection  Xarelto was discontinued in past sec to heavy menses  Recommend resuming AC and outpt followup for possible embolic etiology  Interrogation of loop recorder   Pt wishes to f/u with established [facility] MD's  Discussed with MATTHEW VELIZ and Dr Hernandez

## 2023-10-31 NOTE — ED PROVIDER NOTE - NSICDXPASTMEDICALHX_GEN_ALL_CORE_FT
PAST MEDICAL HISTORY:  History of fibromuscular dysplasia 09/2017 diagnosed in [place], follows vasc surgery at [facility]    Seasonal allergies     Ulcerative proctitis

## 2023-10-31 NOTE — CONSULT NOTE ADULT - NS ATTEND AMEND GEN_ALL_CORE FT
Agree with the above. CT reviewed. No evidence of dissection at RA level nor Ao. This may represent embolic event. No evidence of FMD changes at RA level. Recommend AC and embolic workup. She will follow up with her vacular provider at [facility].

## 2023-11-03 LAB
-  AMIKACIN: SIGNIFICANT CHANGE UP
-  AMIKACIN: SIGNIFICANT CHANGE UP
-  AMOXICILLIN/CLAVULANIC ACID: SIGNIFICANT CHANGE UP
-  AMOXICILLIN/CLAVULANIC ACID: SIGNIFICANT CHANGE UP
-  AMPICILLIN/SULBACTAM: SIGNIFICANT CHANGE UP
-  AMPICILLIN/SULBACTAM: SIGNIFICANT CHANGE UP
-  AMPICILLIN: SIGNIFICANT CHANGE UP
-  AMPICILLIN: SIGNIFICANT CHANGE UP
-  AZTREONAM: SIGNIFICANT CHANGE UP
-  AZTREONAM: SIGNIFICANT CHANGE UP
-  CEFAZOLIN: SIGNIFICANT CHANGE UP
-  CEFAZOLIN: SIGNIFICANT CHANGE UP
-  CEFEPIME: SIGNIFICANT CHANGE UP
-  CEFEPIME: SIGNIFICANT CHANGE UP
-  CEFOXITIN: SIGNIFICANT CHANGE UP
-  CEFOXITIN: SIGNIFICANT CHANGE UP
-  CEFTRIAXONE: SIGNIFICANT CHANGE UP
-  CEFTRIAXONE: SIGNIFICANT CHANGE UP
-  CEFUROXIME: SIGNIFICANT CHANGE UP
-  CEFUROXIME: SIGNIFICANT CHANGE UP
-  CIPROFLOXACIN: SIGNIFICANT CHANGE UP
-  CIPROFLOXACIN: SIGNIFICANT CHANGE UP
-  ERTAPENEM: SIGNIFICANT CHANGE UP
-  ERTAPENEM: SIGNIFICANT CHANGE UP
-  GENTAMICIN: SIGNIFICANT CHANGE UP
-  GENTAMICIN: SIGNIFICANT CHANGE UP
-  IMIPENEM: SIGNIFICANT CHANGE UP
-  IMIPENEM: SIGNIFICANT CHANGE UP
-  LEVOFLOXACIN: SIGNIFICANT CHANGE UP
-  LEVOFLOXACIN: SIGNIFICANT CHANGE UP
-  MEROPENEM: SIGNIFICANT CHANGE UP
-  MEROPENEM: SIGNIFICANT CHANGE UP
-  NITROFURANTOIN: SIGNIFICANT CHANGE UP
-  NITROFURANTOIN: SIGNIFICANT CHANGE UP
-  PIPERACILLIN/TAZOBACTAM: SIGNIFICANT CHANGE UP
-  PIPERACILLIN/TAZOBACTAM: SIGNIFICANT CHANGE UP
-  TOBRAMYCIN: SIGNIFICANT CHANGE UP
-  TOBRAMYCIN: SIGNIFICANT CHANGE UP
-  TRIMETHOPRIM/SULFAMETHOXAZOLE: SIGNIFICANT CHANGE UP
-  TRIMETHOPRIM/SULFAMETHOXAZOLE: SIGNIFICANT CHANGE UP
CULTURE RESULTS: ABNORMAL
CULTURE RESULTS: ABNORMAL
METHOD TYPE: SIGNIFICANT CHANGE UP
METHOD TYPE: SIGNIFICANT CHANGE UP
ORGANISM # SPEC MICROSCOPIC CNT: ABNORMAL
ORGANISM # SPEC MICROSCOPIC CNT: ABNORMAL
ORGANISM # SPEC MICROSCOPIC CNT: SIGNIFICANT CHANGE UP
ORGANISM # SPEC MICROSCOPIC CNT: SIGNIFICANT CHANGE UP
SPECIMEN SOURCE: SIGNIFICANT CHANGE UP
SPECIMEN SOURCE: SIGNIFICANT CHANGE UP

## 2023-11-15 ENCOUNTER — APPOINTMENT (OUTPATIENT)
Dept: MRI IMAGING | Facility: HOSPITAL | Age: 43
End: 2023-11-15

## 2023-11-16 ENCOUNTER — APPOINTMENT (OUTPATIENT)
Dept: NEUROSURGERY | Facility: CLINIC | Age: 43
End: 2023-11-16

## 2024-01-08 ENCOUNTER — APPOINTMENT (OUTPATIENT)
Dept: DERMATOLOGY | Facility: CLINIC | Age: 44
End: 2024-01-08

## 2024-01-22 ENCOUNTER — APPOINTMENT (OUTPATIENT)
Dept: PAIN MANAGEMENT | Facility: CLINIC | Age: 44
End: 2024-01-22

## 2024-05-03 ENCOUNTER — APPOINTMENT (OUTPATIENT)
Dept: GASTROENTEROLOGY | Facility: CLINIC | Age: 44
End: 2024-05-03

## 2024-07-05 NOTE — ED ADULT TRIAGE NOTE - ACCOMPANIED BY
Department of Anesthesiology  Preprocedure Note       Name:  Kaye Danielle   Age:  64 y.o.  :  1959                                          MRN:  [id number]         Date:  2024      Surgeon: Surgeon(s):  Fadumo Estrada MD    Procedure: Procedure(s):  ESOPHAGOGASTRODUODENOSCOPY BIOPSY  COLONOSCOPY DIAGNOSTIC    Medications prior to admission:   Prior to Admission medications    Medication Sig Start Date End Date Taking? Authorizing Provider   polyethylene glycol (GLYCOLAX) 17 GM/SCOOP powder Please follow instructions as given to you by your provider 24   Fadumo Estrada MD   bisacodyl 5 MG EC tablet Please follow instructions as given to you by your provider 24   Fadumo Estrada MD   diclofenac sodium (VOLTAREN) 1 % GEL Apply 4 g topically 4 times daily 24   Esperanza Mercado MD   polyethylene glycol (GLYCOLAX) 17 GM/SCOOP powder Dispense 4 Dulcolax tablets with this prescription.  Use as directed by following your patient instructions given by your physician. 24   Fadumo Estrada MD   losartan (COZAAR) 100 MG tablet TAKE 1 TABLET BY MOUTH ONCE DAILY 5/15/24   Esperanza Mercado MD   oxyBUTYnin (DITROPAN XL) 10 MG extended release tablet Take 1 tablet by mouth daily Please send all prescription pills in bubble pack. 24   Esperanza Mercado MD   vitamin D (VITAMIN D3) 50 MCG (2000 UT) CAPS capsule Take 1 capsule by mouth daily Please give in bubble pack 24   Esperanza Mercado MD   metFORMIN (GLUCOPHAGE-XR) 500 MG extended release tablet Take 1 tablet by mouth daily (with breakfast) Please give in bubble pack 24   Esperanza Mercado MD   metoprolol succinate (TOPROL XL) 50 MG extended release tablet Take 1 tablet by mouth daily Please dispense in bubble pack 24   Esperanza Mercado MD   famotidine (PEPCID) 20 MG tablet Take 1 tablet by mouth 2 times daily 24   Esperanza Mercado MD   sertraline (ZOLOFT) 50 MG tablet Take 1 tablet by mouth daily 3/28/24   Esperanza Mercado MD   amLODIPine 
Self

## 2024-07-18 ENCOUNTER — APPOINTMENT (OUTPATIENT)
Dept: PAIN MANAGEMENT | Facility: CLINIC | Age: 44
End: 2024-07-18

## 2024-08-06 ENCOUNTER — NON-APPOINTMENT (OUTPATIENT)
Age: 44
End: 2024-08-06

## 2024-08-06 ENCOUNTER — APPOINTMENT (OUTPATIENT)
Dept: OPHTHALMOLOGY | Facility: CLINIC | Age: 44
End: 2024-08-06

## 2024-08-06 PROCEDURE — 92014 COMPRE OPH EXAM EST PT 1/>: CPT

## 2024-09-25 ENCOUNTER — APPOINTMENT (OUTPATIENT)
Dept: OPHTHALMOLOGY | Facility: CLINIC | Age: 44
End: 2024-09-25

## 2024-10-09 NOTE — ED ADULT NURSE REASSESSMENT NOTE - NS ED NURSE REASSESS COMMENT FT1
Pt refusing oral and IV contrast. pt educated still refusing. MD made aware. MD at bedside. History/Exam/Medical decision making

## 2024-10-11 ENCOUNTER — APPOINTMENT (OUTPATIENT)
Dept: ORTHOPEDIC SURGERY | Facility: CLINIC | Age: 44
End: 2024-10-11
Payer: COMMERCIAL

## 2024-10-11 VITALS — BODY MASS INDEX: 19.83 KG/M2 | WEIGHT: 119 LBS | HEIGHT: 65 IN

## 2024-10-11 DIAGNOSIS — M25.512 PAIN IN LEFT SHOULDER: ICD-10-CM

## 2024-10-11 DIAGNOSIS — S73.191A OTHER SPRAIN OF RIGHT HIP, INITIAL ENCOUNTER: ICD-10-CM

## 2024-10-11 PROCEDURE — 73030 X-RAY EXAM OF SHOULDER: CPT | Mod: LT

## 2024-10-11 PROCEDURE — 99213 OFFICE O/P EST LOW 20 MIN: CPT | Mod: 25

## 2024-10-16 ENCOUNTER — APPOINTMENT (OUTPATIENT)
Dept: MAMMOGRAPHY | Facility: CLINIC | Age: 44
End: 2024-10-16

## 2024-10-16 ENCOUNTER — APPOINTMENT (OUTPATIENT)
Dept: ULTRASOUND IMAGING | Facility: CLINIC | Age: 44
End: 2024-10-16

## 2024-11-07 ENCOUNTER — APPOINTMENT (OUTPATIENT)
Dept: OPHTHALMOLOGY | Facility: CLINIC | Age: 44
End: 2024-11-07
Payer: COMMERCIAL

## 2024-11-07 ENCOUNTER — NON-APPOINTMENT (OUTPATIENT)
Age: 44
End: 2024-11-07

## 2024-11-07 PROCEDURE — 92012 INTRM OPH EXAM EST PATIENT: CPT

## 2024-12-03 ENCOUNTER — NON-APPOINTMENT (OUTPATIENT)
Age: 44
End: 2024-12-03

## 2024-12-03 ENCOUNTER — APPOINTMENT (OUTPATIENT)
Dept: OPHTHALMOLOGY | Facility: CLINIC | Age: 44
End: 2024-12-03
Payer: COMMERCIAL

## 2024-12-03 PROCEDURE — 68761 CLOSE TEAR DUCT OPENING: CPT | Mod: E1,E4

## 2024-12-03 PROCEDURE — 92012 INTRM OPH EXAM EST PATIENT: CPT | Mod: 25

## 2024-12-31 ENCOUNTER — NON-APPOINTMENT (OUTPATIENT)
Age: 44
End: 2024-12-31

## 2024-12-31 ENCOUNTER — APPOINTMENT (OUTPATIENT)
Dept: OPHTHALMOLOGY | Facility: CLINIC | Age: 44
End: 2024-12-31
Payer: COMMERCIAL

## 2024-12-31 PROCEDURE — 92012 INTRM OPH EXAM EST PATIENT: CPT

## 2025-01-22 ENCOUNTER — NON-APPOINTMENT (OUTPATIENT)
Age: 45
End: 2025-01-22

## 2025-02-06 ENCOUNTER — APPOINTMENT (OUTPATIENT)
Dept: OPHTHALMOLOGY | Facility: CLINIC | Age: 45
End: 2025-02-06
Payer: COMMERCIAL

## 2025-02-06 ENCOUNTER — NON-APPOINTMENT (OUTPATIENT)
Age: 45
End: 2025-02-06

## 2025-02-06 PROCEDURE — 92012 INTRM OPH EXAM EST PATIENT: CPT

## 2025-03-06 ENCOUNTER — NON-APPOINTMENT (OUTPATIENT)
Age: 45
End: 2025-03-06

## 2025-03-06 ENCOUNTER — APPOINTMENT (OUTPATIENT)
Dept: DERMATOLOGY | Facility: CLINIC | Age: 45
End: 2025-03-06
Payer: COMMERCIAL

## 2025-03-06 DIAGNOSIS — Z12.83 ENCOUNTER FOR SCREENING FOR MALIGNANT NEOPLASM OF SKIN: ICD-10-CM

## 2025-03-06 DIAGNOSIS — L71.9 ROSACEA, UNSPECIFIED: ICD-10-CM

## 2025-03-06 DIAGNOSIS — L57.3 POIKILODERMA OF CIVATTE: ICD-10-CM

## 2025-03-06 DIAGNOSIS — Z85.828 PERSONAL HISTORY OF OTHER MALIGNANT NEOPLASM OF SKIN: ICD-10-CM

## 2025-03-06 DIAGNOSIS — L82.1 OTHER SEBORRHEIC KERATOSIS: ICD-10-CM

## 2025-03-06 PROCEDURE — 99214 OFFICE O/P EST MOD 30 MIN: CPT

## 2025-03-06 RX ORDER — TRETINOIN 0.5 MG/G
0.05 CREAM TOPICAL
Qty: 1 | Refills: 2 | Status: ACTIVE | COMMUNITY
Start: 2025-03-06 | End: 1900-01-01

## 2025-03-06 RX ORDER — METRONIDAZOLE 7.5 MG/G
0.75 CREAM TOPICAL TWICE DAILY
Qty: 45 | Refills: 2 | Status: ACTIVE | COMMUNITY
Start: 2025-03-06 | End: 1900-01-01

## 2025-04-01 ENCOUNTER — APPOINTMENT (OUTPATIENT)
Dept: OPHTHALMOLOGY | Facility: CLINIC | Age: 45
End: 2025-04-01
Payer: COMMERCIAL

## 2025-04-01 ENCOUNTER — NON-APPOINTMENT (OUTPATIENT)
Age: 45
End: 2025-04-01

## 2025-04-01 PROCEDURE — 68761 CLOSE TEAR DUCT OPENING: CPT | Mod: E1,E3,E4

## 2025-04-01 PROCEDURE — 92012 INTRM OPH EXAM EST PATIENT: CPT | Mod: 25

## 2025-04-24 ENCOUNTER — APPOINTMENT (OUTPATIENT)
Dept: NEUROSURGERY | Facility: CLINIC | Age: 45
End: 2025-04-24
Payer: MEDICARE

## 2025-04-24 DIAGNOSIS — I67.1 CEREBRAL ANEURYSM, NONRUPTURED: ICD-10-CM

## 2025-04-24 PROCEDURE — 99203 OFFICE O/P NEW LOW 30 MIN: CPT | Mod: 2W

## 2025-04-29 ENCOUNTER — EMERGENCY (EMERGENCY)
Facility: HOSPITAL | Age: 45
LOS: 1 days | End: 2025-04-29
Attending: EMERGENCY MEDICINE | Admitting: EMERGENCY MEDICINE
Payer: MEDICARE

## 2025-04-29 VITALS
OXYGEN SATURATION: 99 % | WEIGHT: 119.93 LBS | HEART RATE: 74 BPM | SYSTOLIC BLOOD PRESSURE: 119 MMHG | RESPIRATION RATE: 20 BRPM | HEIGHT: 65 IN | DIASTOLIC BLOOD PRESSURE: 72 MMHG | TEMPERATURE: 98 F

## 2025-04-29 VITALS
DIASTOLIC BLOOD PRESSURE: 78 MMHG | SYSTOLIC BLOOD PRESSURE: 117 MMHG | TEMPERATURE: 98 F | HEART RATE: 71 BPM | OXYGEN SATURATION: 99 % | RESPIRATION RATE: 18 BRPM

## 2025-04-29 DIAGNOSIS — Z98.890 OTHER SPECIFIED POSTPROCEDURAL STATES: Chronic | ICD-10-CM

## 2025-04-29 DIAGNOSIS — K08.409 PARTIAL LOSS OF TEETH, UNSPECIFIED CAUSE, UNSPECIFIED CLASS: Chronic | ICD-10-CM

## 2025-04-29 LAB — HCG UR QL: NEGATIVE — SIGNIFICANT CHANGE UP

## 2025-04-29 PROCEDURE — 99284 EMERGENCY DEPT VISIT MOD MDM: CPT

## 2025-04-29 PROCEDURE — 99283 EMERGENCY DEPT VISIT LOW MDM: CPT

## 2025-04-29 PROCEDURE — 81025 URINE PREGNANCY TEST: CPT

## 2025-04-29 PROCEDURE — 73030 X-RAY EXAM OF SHOULDER: CPT | Mod: 26,RT

## 2025-04-29 PROCEDURE — 73030 X-RAY EXAM OF SHOULDER: CPT

## 2025-04-29 RX ORDER — LIDOCAINE HYDROCHLORIDE 20 MG/ML
1 JELLY TOPICAL ONCE
Refills: 0 | Status: COMPLETED | OUTPATIENT
Start: 2025-04-29 | End: 2025-04-29

## 2025-04-29 RX ORDER — NALTREXONE HYDROCHLORIDE 50 MG/1
1.7 TABLET, FILM COATED ORAL
Refills: 0 | DISCHARGE

## 2025-04-29 RX ORDER — METOPROLOL SUCCINATE 50 MG/1
12.5 TABLET, EXTENDED RELEASE ORAL
Refills: 0 | DISCHARGE

## 2025-04-29 RX ADMIN — LIDOCAINE HYDROCHLORIDE 1 PATCH: 20 JELLY TOPICAL at 17:09

## 2025-04-29 NOTE — ED PROVIDER NOTE - CLINICAL SUMMARY MEDICAL DECISION MAKING FREE TEXT BOX
Patient complaining of right shoulder pain since this morning.  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  Patient denies trauma fevers weakness numbness.  Patient called and made with an appointment with orthopedist Dr. Arenas in 2 days.  Patient does not want to take anything for pain other than lidocaine patch.    Plan x-ray right shoulder lidocaine patch    Differential including but not limited to fracture dislocation subluxation Patient complaining of right shoulder pain since this morning.  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  Patient denies trauma fevers weakness numbness.  Patient called and made with an appointment with orthopedist Dr. Arenas in 2 days.  Patient does not want to take anything for pain other than lidocaine patch.    Plan x-ray right shoulder lidocaine patch    Differential including but not limited to fracture dislocation subluxation sprain

## 2025-04-29 NOTE — ED ADULT TRIAGE NOTE - SPO2 (%)
99 Product 16 Refills: 0 Product 25 Unit Type: mg Product 7 Amount/Unit (Numbers Only): 3 Product 3 Unit Type: grams Product 5 Application Directions: Apply a pea sized amount to full face avoiding the eye area. Start 3 nights weekly and work up to nightly as tolerated. Product 1 Amount/Unit (Numbers Only): 80 Name Of Product 4: Tretinoin 0.025% Cream Product 8 Price/Unit (In Dollars): 175 Product 2 Price/Unit (In Dollars): 64 Name Of Product 6: Tretinoin 0.1% Cream Detail Level: Zone Product 8 Refills: 2 Send Charges To Patient Encounter: Yes Product 8 Unit Type: ml Product 6 Amount/Unit (Numbers Only): 20 Name Of Product 3: Zo Pigment Control and Brightening Cream (Melamin C) Product 7 Price/Unit (In Dollars): 128 Name Of Product 5: Tretinoin 0.05% Cream Product 1 Price/Unit (In Dollars): 66 Product 3 Application Directions: Apply topically to full face every morning Product 7 Application Directions: Apply along lash line with applicator nightly for 3 months, then decrease to 3 nights weekly for maintenance Product 4 Price/Unit (In Dollars): 60 Name Of Product 2: Zo Pigment Control and Blending Cream (Melamix) Name Of Product 8: Latisse 5mL Product 6 Price/Unit (In Dollars): 70 Product 2 Application Directions: Mix with your Retin-A and apply at night Product 8 Amount/Unit (Numbers Only): 5 Product 3 Price/Unit (In Dollars): 125 Name Of Product 7: Latisse 3mL Name Of Product 1: Zo Pigment Control Cream (Melamin) Product 1 Units Dispensed: 1 Product 5 Price/Unit (In Dollars): 65

## 2025-04-29 NOTE — ED PROVIDER NOTE - NSICDXPASTMEDICALHX_GEN_ALL_CORE_FT
PAST MEDICAL HISTORY:  History of fibromuscular dysplasia 09/2017 diagnosed in [place], follows vasc surgery at [facility]    Seasonal allergies     Ulcerative proctitis

## 2025-04-29 NOTE — ED PROVIDER NOTE - DIFFERENTIAL DIAGNOSIS
Differential Diagnosis Differential including but not limited to fracture dislocation subluxation Differential including but not limited to fracture dislocation subluxation sprain

## 2025-04-29 NOTE — ED PROVIDER NOTE - CARE PROVIDERS DIRECT ADDRESSES
[email or web address]

## 2025-04-29 NOTE — ED ADULT NURSE NOTE - OBJECTIVE STATEMENT
Pt comes in complaining of R shoulder pain after working out earlier today. Pt states she was working out when she felt a pain run up her R neck. with ensuing pain on R shoulder. Pt states she tried to walk it off but the pain continued to worsen over time. She believes her shoulder is out of place. Has had dislocations on L shoulder in the past and states that it "feels just like last time." Denies LOC or head injury during incident. On Xarelto. Unable to move shoulder past 45 degrees. Hx of Winsome-Danlos syndrome.

## 2025-04-29 NOTE — ED PROVIDER NOTE - CARE PROVIDER_API CALL
Toney Arenas  Orthopaedic Sports Medicine  [address], [address]  [place] [address]  Phone: [phone]  Fax: [phone]  Follow Up Time: 1-3 Days

## 2025-04-29 NOTE — ED PROVIDER NOTE - OBJECTIVE STATEMENT
Patient complaining of right shoulder pain since this morning.  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  Patient denies trauma fevers weakness numbness.  Patient called and made with an appointment with orthopedist Dr. Arenas in 2 days.  Patient does not want to take anything for pain other than lidocaine patch.

## 2025-04-29 NOTE — ED PROVIDER NOTE - NSFOLLOWUPINSTRUCTIONS_ED_ALL_ED_FT
Shoulder Pain  Many things can cause shoulder pain, including:  An injury to the shoulder.  Overuse of the shoulder.  Arthritis.  The source of the pain can be:  Inflammation.  An injury to the shoulder joint.  An injury to a tendon, ligament, or bone.  Follow these instructions at home:  Pay attention to changes in your symptoms. Let your health care provider know about them. Follow these instructions to relieve your pain.    If you have a removable sling:    Wear the sling as told by your provider. Remove it only as told by your provider.  Check the skin around the sling every day. Tell your provider about any concerns.  Loosen the sling if your fingers tingle, become numb, or become cold.  Keep the sling clean.  If the sling is not waterproof:  Do not let it get wet.  Remove it to shower or bathe.  Move your arm as little as possible, but keep your hand moving to prevent swelling.  Managing pain, stiffness, and swelling    Bag of ice on a towel on the skin.  If told, put ice on the painful area.  If you have a removable sling or immobilizer, remove it as told by your provider.  Put ice in a plastic bag.  Place a towel between your skin and the bag.  Leave the ice on for 20 minutes, 2–3 times a day.  If your skin turns bright red, remove the ice right away to prevent skin damage. The risk of damage is higher if you cannot feel pain, heat, or cold.  Move your fingers often to reduce stiffness and swelling.  Squeeze a soft ball or a foam pad as much as possible. This helps to keep the shoulder from swelling. It also helps to strengthen the arm.  General instructions    Take over-the-counter and prescription medicines only as told by your provider.  Exercise may help with pain management. Perform exercises if told by your provider.  You may be referred to a physical therapist to help in your recovery process.  Keep all follow-up visits in order to avoid any type of permanent shoulder disability or chronic pain problems.  Contact a health care provider if:  Your pain is not relieved with medicines.  New pain develops in your arm, hand, or fingers.  You loosen your sling and your arm, hand, or fingers remain tingly, numb, swollen, or painful.  Get help right away if:  Your arm, hand, or fingers turn white or blue.  This information is not intended to replace advice given to you by your health care provider. Make sure you discuss any questions you have with your health care provider.

## 2025-04-29 NOTE — ED PROVIDER NOTE - PATIENT PORTAL LINK FT
You can access the FollowMyHealth Patient Portal offered by [facility] by registering at the following website: [email or web address] By joining [employer]’s FollowMyHealth portal, you will also be able to view your health information using other applications (apps) compatible with our system.

## 2025-04-29 NOTE — ED PROVIDER NOTE - NSICDXPASTSURGICALHX_GEN_ALL_CORE_FT
PAST SURGICAL HISTORY:  H/O rhinoplasty age 14    S/P LASIK surgery of both eyes 2005    [place] teeth extracted

## 2025-05-01 ENCOUNTER — APPOINTMENT (OUTPATIENT)
Dept: ORTHOPEDIC SURGERY | Facility: CLINIC | Age: 45
End: 2025-05-01
Payer: MEDICARE

## 2025-05-01 ENCOUNTER — NON-APPOINTMENT (OUTPATIENT)
Age: 45
End: 2025-05-01

## 2025-05-01 ENCOUNTER — APPOINTMENT (OUTPATIENT)
Dept: MRI IMAGING | Facility: CLINIC | Age: 45
End: 2025-05-01

## 2025-05-01 DIAGNOSIS — S43.001A UNSPECIFIED SUBLUXATION OF RIGHT SHOULDER JOINT, INITIAL ENCOUNTER: ICD-10-CM

## 2025-05-01 PROCEDURE — 99204 OFFICE O/P NEW MOD 45 MIN: CPT

## 2025-05-03 ENCOUNTER — APPOINTMENT (OUTPATIENT)
Dept: MRI IMAGING | Facility: CLINIC | Age: 45
End: 2025-05-03
Payer: MEDICARE

## 2025-05-03 ENCOUNTER — RESULT REVIEW (OUTPATIENT)
Age: 45
End: 2025-05-03

## 2025-05-03 ENCOUNTER — OUTPATIENT (OUTPATIENT)
Dept: OUTPATIENT SERVICES | Facility: HOSPITAL | Age: 45
LOS: 1 days | End: 2025-05-03
Payer: MEDICARE

## 2025-05-03 DIAGNOSIS — K08.409 PARTIAL LOSS OF TEETH, UNSPECIFIED CAUSE, UNSPECIFIED CLASS: Chronic | ICD-10-CM

## 2025-05-03 DIAGNOSIS — Z98.890 OTHER SPECIFIED POSTPROCEDURAL STATES: Chronic | ICD-10-CM

## 2025-05-03 DIAGNOSIS — S43.001A UNSPECIFIED SUBLUXATION OF RIGHT SHOULDER JOINT, INITIAL ENCOUNTER: ICD-10-CM

## 2025-05-03 PROCEDURE — 73221 MRI JOINT UPR EXTREM W/O DYE: CPT | Mod: 26,RT

## 2025-05-03 PROCEDURE — 73221 MRI JOINT UPR EXTREM W/O DYE: CPT | Mod: MH

## 2025-05-15 ENCOUNTER — APPOINTMENT (OUTPATIENT)
Dept: ORTHOPEDIC SURGERY | Facility: CLINIC | Age: 45
End: 2025-05-15
Payer: MEDICARE

## 2025-05-15 PROCEDURE — 99212 OFFICE O/P EST SF 10 MIN: CPT

## 2025-05-19 ENCOUNTER — APPOINTMENT (OUTPATIENT)
Dept: RHEUMATOLOGY | Facility: CLINIC | Age: 45
End: 2025-05-19
Payer: MEDICARE

## 2025-05-19 VITALS
BODY MASS INDEX: 19.99 KG/M2 | HEART RATE: 63 BPM | SYSTOLIC BLOOD PRESSURE: 113 MMHG | DIASTOLIC BLOOD PRESSURE: 75 MMHG | HEIGHT: 65 IN | OXYGEN SATURATION: 100 % | WEIGHT: 120 LBS

## 2025-05-19 DIAGNOSIS — K51.20 ULCERATIVE (CHRONIC) PROCTITIS W/OUT COMPLICATIONS: ICD-10-CM

## 2025-05-19 DIAGNOSIS — R21 RASH AND OTHER NONSPECIFIC SKIN ERUPTION: ICD-10-CM

## 2025-05-19 DIAGNOSIS — K11.7 DISTURBANCES OF SALIVARY SECRETION: ICD-10-CM

## 2025-05-19 DIAGNOSIS — H04.123 DRY EYE SYNDROME OF BILATERAL LACRIMAL GLANDS: ICD-10-CM

## 2025-05-19 DIAGNOSIS — R76.8 OTHER SPECIFIED ABNORMAL IMMUNOLOGICAL FINDINGS IN SERUM: ICD-10-CM

## 2025-05-19 DIAGNOSIS — I88.9 NONSPECIFIC LYMPHADENITIS, UNSPECIFIED: ICD-10-CM

## 2025-05-19 DIAGNOSIS — S73.003A UNSPECIFIED SUBLUXATION OF UNSPECIFIED HIP, INITIAL ENCOUNTER: ICD-10-CM

## 2025-05-19 DIAGNOSIS — M94.0 CHONDROCOSTAL JUNCTION SYNDROME [TIETZE]: ICD-10-CM

## 2025-05-19 DIAGNOSIS — R59.1 GENERALIZED ENLARGED LYMPH NODES: ICD-10-CM

## 2025-05-19 DIAGNOSIS — Q79.63 VASCULAR EHLERS-DANLOS SYNDROME: ICD-10-CM

## 2025-05-19 PROCEDURE — G2212 PROLONG OUTPT/OFFICE VIS: CPT

## 2025-05-19 PROCEDURE — 99205 OFFICE O/P NEW HI 60 MIN: CPT

## 2025-05-19 PROCEDURE — 36415 COLL VENOUS BLD VENIPUNCTURE: CPT

## 2025-05-20 LAB
ANA SER QL IA: NEGATIVE
CENTROMERE IGG SER-ACNC: <0.2 AL
CHROMATIN AB SERPL-ACNC: <0.2 AL
CRP SERPL-MCNC: <3 MG/L
DSDNA AB SER-ACNC: 1 IU/ML
ENA JO1 AB SER IA-ACNC: <0.2 AL
ENA RNP AB SER IA-ACNC: <0.2 AL
ENA SCL70 IGG SER IA-ACNC: <0.2 AL
ENA SM AB SER IA-ACNC: <0.2 AL
ENA SS-A AB SER IA-ACNC: <0.2 AL
ENA SS-B AB SER IA-ACNC: <0.2 AL
ERYTHROCYTE [SEDIMENTATION RATE] IN BLOOD BY WESTERGREN METHOD: 19 MM/HR
RIBOSOMAL P AB SER IA-ACNC: <0.2 AL
URATE SERPL-MCNC: 3.7 MG/DL

## 2025-05-21 LAB — ACE BLD-CCNC: 38 U/L

## 2025-05-23 ENCOUNTER — TRANSCRIPTION ENCOUNTER (OUTPATIENT)
Age: 45
End: 2025-05-23

## 2025-05-23 LAB
A-TUMOR NECROSIS FACT SERPL-MCNC: 2 PG/ML
ANTI-BETA2 GLYCOPROTEIN 1 IGA CONCENTRATION: 2.9 U/ML
ANTI-BETA2 GLYCOPROTEIN 1 IGG CONCENTRATION (ELFA): 3.2 U/ML
ANTI-BETA2 GLYCOPROTEIN 1 IGG CONCENTRATION (ELFA): 3.2 U/ML
ANTI-BETA2 GLYCOPROTEIN 1 IGM CONCENTRATION (ELFA): 5 U/ML
ANTI-BETA2 GLYCOPROTEIN 1 IGM CONCENTRATION (ELFA): 5 U/ML
ANTI-C1Q IGG CONCENTRATION: 23.3 UNITS
ANTI-C1Q IGG CONCENTRATION: 23.3 UNITS
ANTI-CARDIOLIPIN IGA CONCENTRATION: 2.5 APL
ANTI-CARDIOLIPIN IGG CONCENTRATION (ELFA): 2.8 GPL
ANTI-CARDIOLIPIN IGG CONCENTRATION (ELFA): 2.8 GPL
ANTI-CARDIOLIPIN IGM CONCENTRATION (ELFA): 3.7 MPL
ANTI-CARDIOLIPIN IGM CONCENTRATION (ELFA): 3.7 MPL
ANTI-CENP IGG CONCENTRATION (ELFA): 1 U/ML
ANTI-CYCLIC CITRULLINATED PEPTIDE IGG CONCENTRATION (ELFA): 2.4 U/ML
ANTI-DOUBLE-STRANDED DNA IGG CONCENTRATION (ELISA): 49.01 IU/ML
ANTI-DOUBLE-STRANDED DNA IGG CONCT CIA: 11.3 IU/ML
ANTI-GBM IGG CONCENTRATION: <2.9
ANTI-JO-1 IGG CONCENTRATION (ELFA): 0.5 U/ML
ANTI-MPO IGG CONCENTRATION: 3.2
ANTI-NEUTROPHIL CYTOPLASMIC ANTIBODIES - PATTERN: NEGATIVE
ANTI-NEUTROPHIL CYTOPLASMIC ANTIBODIES - TITER: NORMAL
ANTI-NUCLEAR ANTIBODIES - CYTOPLASMIC PATTERN: NORMAL
ANTI-NUCLEAR ANTIBODIES - PRIMARY NUCLEAR PATTERN: NORMAL
ANTI-NUCLEAR ANTIBODIES - PRIMARY PATTERN TITER (IFA): ABNORMAL
ANTI-NUCLEAR ANTIBODIES IGG CONCENTRATION (ELISA): 27.23 UNITS
ANTI-PHOSPHATIDYLSERINE/PROTHROMBIN IGG CONCENTRATION: 7.8 UNITS
ANTI-PHOSPHATIDYLSERINE/PROTHROMBIN IGM CONCENTRATION: 11.86 UNITS
ANTI-PR3 IGG CONCENTRATION: 26.5
ANTI-RA33 IGA CONCENTRATION (ELFA): 2.2 U/ML
ANTI-RA33 IGG CONCENTRATION (ELFA): 8.5 U/ML
ANTI-RA33 IGM CONCENTRATION (ELFA): 36 U/ML
ANTI-RIBOSOMAL P IGG CONCENTRATION: 1.1 U/ML
ANTI-RNA POL III IGG CONCENTRATION (ELFA): <0.7 U/ML
ANTI-RNP70 IGG CONCENTRATION (ELFA): 5.2 U/ML
ANTI-RO52 IGG CONCENTRATION (ELFA): 0.9 U/ML
ANTI-RO60 IGG CONCENTRATION (ELFA): 0.5 U/ML
ANTI-SCL-70 IGG CONCENTRATION (ELFA): 1.1 U/ML
ANTI-SMITH IGG CONCENTRATION (ELFA): 3 U/ML
ANTI-SS-B (LA) IGG CONCENTRATION (ELFA): 0.9 U/ML
ANTI-THYROGLOBULIN IGG CONCENTRATION (ELFA): 33 IU/ML
ANTI-THYROID PEROXIDASE IGG CONCENTRATION (ELFA): 5.5 IU/ML
ANTI-U1RNP IGG CONCENTRATION (ELFA): 4.6 U/ML
AVISE CARP IGG: 10.71 UNITS
AVISE LUPUS INDEX: -2
AVISE LUPUS RESULT: NEGATIVE
AVISE VASCULITIS RESULT: NORMAL
B-LYMPHOCYTE-BOUND C4D (BC4D) LEVEL (FC): 9
COMPLEMENT C3C CONCENTRATION: 125.52 MG/DL
COMPLEMENT C4 CONCENTRATION: 20.17 MG/DL
ERYTHROCYTE-BOUND C4D (EC4D) LEVEL (FC): 4
IGNF SERPL-MCNC: <4.2 PG/ML
IL10 SERPL-MCNC: <2.8 PG/ML
IL12 SERPL-MCNC: <1.9 PG/ML
IL13 SERPL-MCNC: <1.7 PG/ML
IL17A SERPL-MCNC: <1.4 PG/ML
IL2 SERPL-MCNC: 338.6 PG/ML
IL2 SERPL-MCNC: <2.1 PG/ML
IL4 SERPL-MCNC: <2.2 PG/ML
IL6 SERPL-MCNC: <2 PG/ML
IL8 SERPL-MCNC: <3 PG/ML
INTERLEUKIN 1 BETA: <6.5 PG/ML
INTERLEUKIN 5: <2.1 PG/ML
RHEUMATOID FACTOR (IGA) CONCENTRATION (ELFA): 4.8 IU/ML
RHEUMATOID FACTOR (IGM) CONCENTRATION (ELFA): 1.3 IU/ML
TC4D (FC): <179
TIGG (FC): 362
TIGM (FC): <187

## 2025-05-23 RX ORDER — NALTREXONE 100 %
POWDER (GRAM) MISCELLANEOUS
Qty: 0.18 | Refills: 3 | Status: ACTIVE | COMMUNITY
Start: 2025-05-23 | End: 1900-01-01

## 2025-05-27 LAB — 14-3-3 ETA AG SER IA-MCNC: <0.2 NG/ML

## 2025-05-28 LAB
CA VI IGA AB: 5.8 EU/ML
CA VI IGG AB: 20.6 EU/ML
CA VI IGM AB: 10 EU/ML
CARP (RCYP C) 1 IGE QN: <20 UNITS
CENP-A: <11 SI
CENP-B: <11 SI
CEP-1 AB SER IA-ACNC: <20 UNITS
FIBRILLARIN: <11 SI
Lab: <20 U/ML
MUT CIT VIMENTIN AB SER-ACNC: <20 UNITS
PM/SCL-100: <11 SI
PM/SCL-75: <11 SI
PSP IGA AB: 4.1 EU/ML
PSP IGG AB: 10 EU/ML
PSP IGM AB: 28.5 EU/ML
RP11: <11 SI
RP155: <11 SI
SCL-70: <11 SI
SEROLOGY COMMENTS: NORMAL
SP-1 IGA AB: 7.4 EU/ML
SP-1 IGG AB: 11.8 EU/ML
SP-1 IGM AB: 5.8 EU/ML
TH/TO: <11 SI
U1-SNRNP RNP A: <11 SI
U1-SNRNP RNP C: <11 SI
U1-SNRNP RNP-70KD: <11 SI

## 2025-06-10 ENCOUNTER — TRANSCRIPTION ENCOUNTER (OUTPATIENT)
Age: 45
End: 2025-06-10

## 2025-06-12 ENCOUNTER — TRANSCRIPTION ENCOUNTER (OUTPATIENT)
Age: 45
End: 2025-06-12

## 2025-06-16 ENCOUNTER — TRANSCRIPTION ENCOUNTER (OUTPATIENT)
Age: 45
End: 2025-06-16

## 2025-07-03 ENCOUNTER — APPOINTMENT (OUTPATIENT)
Dept: RHEUMATOLOGY | Facility: CLINIC | Age: 45
End: 2025-07-03
Payer: MEDICARE

## 2025-07-03 PROBLEM — R76.8 PR3 ANCA ANTIBODIES PRESENT: Status: ACTIVE | Noted: 2025-07-03

## 2025-07-03 PROCEDURE — G2211 COMPLEX E/M VISIT ADD ON: CPT | Mod: 2W

## 2025-07-03 PROCEDURE — 99215 OFFICE O/P EST HI 40 MIN: CPT | Mod: 2W

## 2025-07-10 ENCOUNTER — TRANSCRIPTION ENCOUNTER (OUTPATIENT)
Age: 45
End: 2025-07-10

## 2025-07-10 RX ORDER — LIFITEGRAST 50 MG/ML
5 SOLUTION/ DROPS OPHTHALMIC
Qty: 1 | Refills: 5 | Status: ACTIVE | COMMUNITY
Start: 2025-07-10 | End: 1900-01-01

## 2025-08-05 ENCOUNTER — LABORATORY RESULT (OUTPATIENT)
Age: 45
End: 2025-08-05

## 2025-09-10 ENCOUNTER — APPOINTMENT (OUTPATIENT)
Dept: MRI IMAGING | Facility: HOSPITAL | Age: 45
End: 2025-09-10

## 2025-09-16 ENCOUNTER — APPOINTMENT (OUTPATIENT)
Dept: NEUROSURGERY | Facility: CLINIC | Age: 45
End: 2025-09-16
Payer: MEDICARE

## 2025-09-16 DIAGNOSIS — I77.71 DISSECTION OF CAROTID ARTERY: ICD-10-CM

## 2025-09-16 PROCEDURE — 99212 OFFICE O/P EST SF 10 MIN: CPT | Mod: 93

## 2025-09-17 ENCOUNTER — TRANSCRIPTION ENCOUNTER (OUTPATIENT)
Age: 45
End: 2025-09-17

## 2025-09-18 ENCOUNTER — TRANSCRIPTION ENCOUNTER (OUTPATIENT)
Age: 45
End: 2025-09-18